# Patient Record
Sex: MALE | Race: BLACK OR AFRICAN AMERICAN | Employment: FULL TIME | URBAN - METROPOLITAN AREA
[De-identification: names, ages, dates, MRNs, and addresses within clinical notes are randomized per-mention and may not be internally consistent; named-entity substitution may affect disease eponyms.]

---

## 2019-03-22 ENCOUNTER — HOSPITAL ENCOUNTER (OUTPATIENT)
Dept: GENERAL RADIOLOGY | Age: 79
Discharge: HOME | End: 2019-03-22
Payer: COMMERCIAL

## 2019-03-22 ENCOUNTER — HOSPITAL ENCOUNTER (OUTPATIENT)
Dept: PREADMISSION TESTING | Age: 79
Discharge: HOME | End: 2019-03-22
Payer: COMMERCIAL

## 2019-03-22 VITALS
HEART RATE: 59 BPM | WEIGHT: 181 LBS | BODY MASS INDEX: 27.43 KG/M2 | TEMPERATURE: 97.7 F | SYSTOLIC BLOOD PRESSURE: 175 MMHG | HEIGHT: 68 IN | DIASTOLIC BLOOD PRESSURE: 72 MMHG

## 2019-03-22 DIAGNOSIS — Z01.818 PRE-OP TESTING: ICD-10-CM

## 2019-03-22 LAB
ABO + RH BLD: NORMAL
ALBUMIN SERPL-MCNC: 3.5 G/DL (ref 3.5–5)
ALBUMIN/GLOB SERPL: 0.9 {RATIO} (ref 1.1–2.2)
ALP SERPL-CCNC: 162 U/L (ref 45–117)
ALT SERPL-CCNC: 15 U/L (ref 12–78)
ANION GAP SERPL CALC-SCNC: 5 MMOL/L (ref 5–15)
APTT PPP: 29.9 SEC (ref 22.1–32)
AST SERPL-CCNC: 10 U/L (ref 15–37)
BASOPHILS # BLD: 0.1 K/UL (ref 0–0.1)
BASOPHILS NFR BLD: 1 % (ref 0–1)
BILIRUB SERPL-MCNC: 0.3 MG/DL (ref 0.2–1)
BLOOD GROUP ANTIBODIES SERPL: NORMAL
BUN SERPL-MCNC: 11 MG/DL (ref 6–20)
BUN/CREAT SERPL: 10 (ref 12–20)
CALCIUM SERPL-MCNC: 9.4 MG/DL (ref 8.5–10.1)
CHLORIDE SERPL-SCNC: 108 MMOL/L (ref 97–108)
CO2 SERPL-SCNC: 28 MMOL/L (ref 21–32)
CREAT SERPL-MCNC: 1.13 MG/DL (ref 0.7–1.3)
DIFFERENTIAL METHOD BLD: ABNORMAL
EOSINOPHIL # BLD: 0.3 K/UL (ref 0–0.4)
EOSINOPHIL NFR BLD: 5 % (ref 0–7)
ERYTHROCYTE [DISTWIDTH] IN BLOOD BY AUTOMATED COUNT: 14.7 % (ref 11.5–14.5)
GLOBULIN SER CALC-MCNC: 3.8 G/DL (ref 2–4)
GLUCOSE SERPL-MCNC: 84 MG/DL (ref 65–100)
HCT VFR BLD AUTO: 44.3 % (ref 36.6–50.3)
HGB BLD-MCNC: 14.6 G/DL (ref 12.1–17)
IMM GRANULOCYTES # BLD AUTO: 0 K/UL (ref 0–0.04)
IMM GRANULOCYTES NFR BLD AUTO: 0 % (ref 0–0.5)
INR PPP: 1 (ref 0.9–1.1)
LYMPHOCYTES # BLD: 1.7 K/UL (ref 0.8–3.5)
LYMPHOCYTES NFR BLD: 30 % (ref 12–49)
MCH RBC QN AUTO: 29.6 PG (ref 26–34)
MCHC RBC AUTO-ENTMCNC: 33 G/DL (ref 30–36.5)
MCV RBC AUTO: 89.9 FL (ref 80–99)
MONOCYTES # BLD: 0.5 K/UL (ref 0–1)
MONOCYTES NFR BLD: 10 % (ref 5–13)
NEUTS SEG # BLD: 3 K/UL (ref 1.8–8)
NEUTS SEG NFR BLD: 54 % (ref 32–75)
NRBC # BLD: 0 K/UL (ref 0–0.01)
NRBC BLD-RTO: 0 PER 100 WBC
PLATELET # BLD AUTO: 286 K/UL (ref 150–400)
PMV BLD AUTO: 9.7 FL (ref 8.9–12.9)
POTASSIUM SERPL-SCNC: 4.2 MMOL/L (ref 3.5–5.1)
PROT SERPL-MCNC: 7.3 G/DL (ref 6.4–8.2)
PROTHROMBIN TIME: 9.9 SEC (ref 9–11.1)
RBC # BLD AUTO: 4.93 M/UL (ref 4.1–5.7)
SODIUM SERPL-SCNC: 141 MMOL/L (ref 136–145)
SPECIMEN EXP DATE BLD: NORMAL
THERAPEUTIC RANGE,PTTT: NORMAL SECS (ref 58–77)
WBC # BLD AUTO: 5.5 K/UL (ref 4.1–11.1)

## 2019-03-22 PROCEDURE — 93005 ELECTROCARDIOGRAM TRACING: CPT

## 2019-03-22 PROCEDURE — 86900 BLOOD TYPING SEROLOGIC ABO: CPT

## 2019-03-22 PROCEDURE — 85610 PROTHROMBIN TIME: CPT

## 2019-03-22 PROCEDURE — 71046 X-RAY EXAM CHEST 2 VIEWS: CPT

## 2019-03-22 PROCEDURE — 85730 THROMBOPLASTIN TIME PARTIAL: CPT

## 2019-03-22 PROCEDURE — 36415 COLL VENOUS BLD VENIPUNCTURE: CPT

## 2019-03-22 PROCEDURE — 85025 COMPLETE CBC W/AUTO DIFF WBC: CPT

## 2019-03-22 PROCEDURE — 80053 COMPREHEN METABOLIC PANEL: CPT

## 2019-03-22 RX ORDER — CLOPIDOGREL BISULFATE 75 MG/1
75 TABLET ORAL
Status: ON HOLD | COMMUNITY
End: 2022-05-13

## 2019-03-22 RX ORDER — ASPIRIN 81 MG/1
81 TABLET ORAL DAILY
COMMUNITY

## 2019-03-22 RX ORDER — LOSARTAN POTASSIUM AND HYDROCHLOROTHIAZIDE 25; 100 MG/1; MG/1
1 TABLET ORAL DAILY
COMMUNITY

## 2019-03-22 RX ORDER — CILOSTAZOL 100 MG/1
TABLET ORAL
COMMUNITY

## 2019-03-22 RX ORDER — AMLODIPINE BESYLATE 10 MG/1
10 TABLET ORAL DAILY
COMMUNITY
End: 2019-12-11 | Stop reason: SDUPTHER

## 2019-03-22 NOTE — PERIOP NOTES
Patient verbalizes understanding of preoperative instructions:  Given skin prep chlorhexidine wipes-given written and verbal instructions on use. Pre-Operative Instructions    DO NOT EAT OR DRINK ANYTHING AFTER MIDNIGHT THE NIGHT BEFORE SURGERY. EKG OK PER ANESTHESIA DR. HILL.

## 2019-03-24 LAB
ATRIAL RATE: 55 BPM
CALCULATED P AXIS, ECG09: 52 DEGREES
CALCULATED R AXIS, ECG10: 40 DEGREES
CALCULATED T AXIS, ECG11: 110 DEGREES
DIAGNOSIS, 93000: NORMAL
P-R INTERVAL, ECG05: 152 MS
Q-T INTERVAL, ECG07: 396 MS
QRS DURATION, ECG06: 84 MS
QTC CALCULATION (BEZET), ECG08: 378 MS
VENTRICULAR RATE, ECG03: 55 BPM

## 2019-03-25 NOTE — ADVANCED PRACTICE NURSE
LVM on nurse line and forwarded PAT labs to PCP, Kylie Srivastava, NP, for continuity of care and f/u as needed re elevated alk phos. Patient poor historian, unknown if this is known/baseline.

## 2019-03-28 ENCOUNTER — APPOINTMENT (OUTPATIENT)
Dept: NON INVASIVE DIAGNOSTICS | Age: 79
DRG: 039 | End: 2019-03-28
Attending: NURSE PRACTITIONER
Payer: COMMERCIAL

## 2019-03-28 ENCOUNTER — ANESTHESIA EVENT (OUTPATIENT)
Dept: SURGERY | Age: 79
DRG: 039 | End: 2019-03-28
Payer: COMMERCIAL

## 2019-03-28 ENCOUNTER — ANESTHESIA (OUTPATIENT)
Dept: SURGERY | Age: 79
DRG: 039 | End: 2019-03-28
Payer: COMMERCIAL

## 2019-03-28 ENCOUNTER — HOSPITAL ENCOUNTER (INPATIENT)
Age: 79
LOS: 1 days | Discharge: HOME | DRG: 039 | End: 2019-03-29
Attending: SURGERY | Admitting: SURGERY
Payer: COMMERCIAL

## 2019-03-28 DIAGNOSIS — I65.22 LEFT CAROTID ARTERY STENOSIS: Primary | ICD-10-CM

## 2019-03-28 PROCEDURE — 77030002916 HC SUT ETHLN J&J -A: Performed by: SURGERY

## 2019-03-28 PROCEDURE — 74011250637 HC RX REV CODE- 250/637: Performed by: SURGERY

## 2019-03-28 PROCEDURE — 77030012406 HC DRN WND PENRS BARD -A: Performed by: SURGERY

## 2019-03-28 PROCEDURE — 51798 US URINE CAPACITY MEASURE: CPT

## 2019-03-28 PROCEDURE — 77030005402 HC CATH RAD ART LN KT TELE -B

## 2019-03-28 PROCEDURE — 74011250636 HC RX REV CODE- 250/636

## 2019-03-28 PROCEDURE — 74011250636 HC RX REV CODE- 250/636: Performed by: ANESTHESIOLOGY

## 2019-03-28 PROCEDURE — 77030002987 HC SUT PROL J&J -B: Performed by: SURGERY

## 2019-03-28 PROCEDURE — 65610000003 HC RM ICU SURGICAL

## 2019-03-28 PROCEDURE — 74011250636 HC RX REV CODE- 250/636: Performed by: SURGERY

## 2019-03-28 PROCEDURE — 76010000172 HC OR TIME 2.5 TO 3 HR INTENSV-TIER 1: Performed by: SURGERY

## 2019-03-28 PROCEDURE — 88304 TISSUE EXAM BY PATHOLOGIST: CPT

## 2019-03-28 PROCEDURE — 74011000250 HC RX REV CODE- 250

## 2019-03-28 PROCEDURE — 77030031139 HC SUT VCRL2 J&J -A: Performed by: SURGERY

## 2019-03-28 PROCEDURE — 76210000006 HC OR PH I REC 0.5 TO 1 HR: Performed by: SURGERY

## 2019-03-28 PROCEDURE — 74011000272 HC RX REV CODE- 272: Performed by: SURGERY

## 2019-03-28 PROCEDURE — 88311 DECALCIFY TISSUE: CPT

## 2019-03-28 PROCEDURE — 77030011640 HC PAD GRND REM COVD -A: Performed by: SURGERY

## 2019-03-28 PROCEDURE — 76060000036 HC ANESTHESIA 2.5 TO 3 HR: Performed by: SURGERY

## 2019-03-28 PROCEDURE — C1768 GRAFT, VASCULAR: HCPCS | Performed by: SURGERY

## 2019-03-28 PROCEDURE — 77030032490 HC SLV COMPR SCD KNE COVD -B: Performed by: SURGERY

## 2019-03-28 PROCEDURE — 93306 TTE W/DOPPLER COMPLETE: CPT

## 2019-03-28 PROCEDURE — 03UL0JZ SUPPLEMENT LEFT INTERNAL CAROTID ARTERY WITH SYNTHETIC SUBSTITUTE, OPEN APPROACH: ICD-10-PCS | Performed by: SURGERY

## 2019-03-28 PROCEDURE — 77030002986 HC SUT PROL J&J -A: Performed by: SURGERY

## 2019-03-28 PROCEDURE — 77030018836 HC SOL IRR NACL ICUM -A: Performed by: SURGERY

## 2019-03-28 PROCEDURE — 77030014008 HC SPNG HEMSTAT J&J -C: Performed by: SURGERY

## 2019-03-28 PROCEDURE — 74011000250 HC RX REV CODE- 250: Performed by: SURGERY

## 2019-03-28 PROCEDURE — 77030002933 HC SUT MCRYL J&J -A: Performed by: SURGERY

## 2019-03-28 PROCEDURE — 77030013079 HC BLNKT BAIR HGGR 3M -A: Performed by: NURSE ANESTHETIST, CERTIFIED REGISTERED

## 2019-03-28 PROCEDURE — 77030008684 HC TU ET CUF COVD -B: Performed by: NURSE ANESTHETIST, CERTIFIED REGISTERED

## 2019-03-28 PROCEDURE — 77030005401 HC CATH RAD ARRO -A

## 2019-03-28 PROCEDURE — 03HB33Z INSERTION OF INFUSION DEVICE INTO RIGHT RADIAL ARTERY, PERCUTANEOUS APPROACH: ICD-10-PCS | Performed by: SURGERY

## 2019-03-28 PROCEDURE — 36620 INSERTION CATHETER ARTERY: CPT

## 2019-03-28 PROCEDURE — 77030020782 HC GWN BAIR PAWS FLX 3M -B

## 2019-03-28 PROCEDURE — 77030018824 HC SHNT CAR ARGY COVD -B: Performed by: SURGERY

## 2019-03-28 PROCEDURE — 77030020256 HC SOL INJ NACL 0.9%  500ML: Performed by: SURGERY

## 2019-03-28 PROCEDURE — 77030013567 HC DRN WND RESERV BARD -A: Performed by: SURGERY

## 2019-03-28 PROCEDURE — 77030026438 HC STYL ET INTUB CARD -A: Performed by: NURSE ANESTHETIST, CERTIFIED REGISTERED

## 2019-03-28 PROCEDURE — 77030018846 HC SOL IRR STRL H20 ICUM -A: Performed by: SURGERY

## 2019-03-28 PROCEDURE — 03CL0ZZ EXTIRPATION OF MATTER FROM LEFT INTERNAL CAROTID ARTERY, OPEN APPROACH: ICD-10-PCS | Performed by: SURGERY

## 2019-03-28 DEVICE — HEMASHIELD PLATINUM FINESSE ULTRA-THIN KNITTED CARDIOVASCULAR PATCH
Type: IMPLANTABLE DEVICE | Site: NECK | Status: FUNCTIONAL
Brand: HEMASHIELD

## 2019-03-28 RX ORDER — FENTANYL CITRATE 50 UG/ML
50 INJECTION, SOLUTION INTRAMUSCULAR; INTRAVENOUS AS NEEDED
Status: DISCONTINUED | OUTPATIENT
Start: 2019-03-28 | End: 2019-03-28 | Stop reason: HOSPADM

## 2019-03-28 RX ORDER — KETAMINE HYDROCHLORIDE 10 MG/ML
INJECTION, SOLUTION INTRAMUSCULAR; INTRAVENOUS AS NEEDED
Status: DISCONTINUED | OUTPATIENT
Start: 2019-03-28 | End: 2019-03-28 | Stop reason: HOSPADM

## 2019-03-28 RX ORDER — SODIUM CHLORIDE 0.9 % (FLUSH) 0.9 %
5-40 SYRINGE (ML) INJECTION EVERY 8 HOURS
Status: DISCONTINUED | OUTPATIENT
Start: 2019-03-28 | End: 2019-03-28 | Stop reason: HOSPADM

## 2019-03-28 RX ORDER — AMLODIPINE BESYLATE 5 MG/1
10 TABLET ORAL DAILY
Status: DISCONTINUED | OUTPATIENT
Start: 2019-03-29 | End: 2019-03-28

## 2019-03-28 RX ORDER — HYDROMORPHONE HYDROCHLORIDE 1 MG/ML
0.2 INJECTION, SOLUTION INTRAMUSCULAR; INTRAVENOUS; SUBCUTANEOUS
Status: DISCONTINUED | OUTPATIENT
Start: 2019-03-28 | End: 2019-03-28 | Stop reason: HOSPADM

## 2019-03-28 RX ORDER — SODIUM CHLORIDE 0.9 % (FLUSH) 0.9 %
5-40 SYRINGE (ML) INJECTION AS NEEDED
Status: DISCONTINUED | OUTPATIENT
Start: 2019-03-28 | End: 2019-03-28 | Stop reason: HOSPADM

## 2019-03-28 RX ORDER — SODIUM CHLORIDE, SODIUM LACTATE, POTASSIUM CHLORIDE, CALCIUM CHLORIDE 600; 310; 30; 20 MG/100ML; MG/100ML; MG/100ML; MG/100ML
100 INJECTION, SOLUTION INTRAVENOUS CONTINUOUS
Status: DISCONTINUED | OUTPATIENT
Start: 2019-03-28 | End: 2019-03-28 | Stop reason: HOSPADM

## 2019-03-28 RX ORDER — OXYCODONE HYDROCHLORIDE 5 MG/1
5 TABLET ORAL AS NEEDED
Status: DISCONTINUED | OUTPATIENT
Start: 2019-03-28 | End: 2019-03-28 | Stop reason: HOSPADM

## 2019-03-28 RX ORDER — ONDANSETRON 2 MG/ML
INJECTION INTRAMUSCULAR; INTRAVENOUS AS NEEDED
Status: DISCONTINUED | OUTPATIENT
Start: 2019-03-28 | End: 2019-03-28 | Stop reason: HOSPADM

## 2019-03-28 RX ORDER — SODIUM CHLORIDE, SODIUM LACTATE, POTASSIUM CHLORIDE, CALCIUM CHLORIDE 600; 310; 30; 20 MG/100ML; MG/100ML; MG/100ML; MG/100ML
50 INJECTION, SOLUTION INTRAVENOUS CONTINUOUS
Status: DISCONTINUED | OUTPATIENT
Start: 2019-03-28 | End: 2019-03-29

## 2019-03-28 RX ORDER — FENTANYL CITRATE 50 UG/ML
25 INJECTION, SOLUTION INTRAMUSCULAR; INTRAVENOUS
Status: DISCONTINUED | OUTPATIENT
Start: 2019-03-28 | End: 2019-03-28 | Stop reason: HOSPADM

## 2019-03-28 RX ORDER — KETOROLAC TROMETHAMINE 30 MG/ML
INJECTION, SOLUTION INTRAMUSCULAR; INTRAVENOUS AS NEEDED
Status: DISCONTINUED | OUTPATIENT
Start: 2019-03-28 | End: 2019-03-28 | Stop reason: HOSPADM

## 2019-03-28 RX ORDER — ONDANSETRON 2 MG/ML
4 INJECTION INTRAMUSCULAR; INTRAVENOUS AS NEEDED
Status: DISCONTINUED | OUTPATIENT
Start: 2019-03-28 | End: 2019-03-28 | Stop reason: HOSPADM

## 2019-03-28 RX ORDER — MIDAZOLAM HYDROCHLORIDE 1 MG/ML
0.5 INJECTION, SOLUTION INTRAMUSCULAR; INTRAVENOUS
Status: DISCONTINUED | OUTPATIENT
Start: 2019-03-28 | End: 2019-03-28 | Stop reason: HOSPADM

## 2019-03-28 RX ORDER — ROPIVACAINE HYDROCHLORIDE 5 MG/ML
30 INJECTION, SOLUTION EPIDURAL; INFILTRATION; PERINEURAL AS NEEDED
Status: DISCONTINUED | OUTPATIENT
Start: 2019-03-28 | End: 2019-03-28 | Stop reason: HOSPADM

## 2019-03-28 RX ORDER — FENTANYL CITRATE 50 UG/ML
50 INJECTION, SOLUTION INTRAMUSCULAR; INTRAVENOUS
Status: DISCONTINUED | OUTPATIENT
Start: 2019-03-28 | End: 2019-03-29 | Stop reason: HOSPADM

## 2019-03-28 RX ORDER — PROPOFOL 10 MG/ML
INJECTION, EMULSION INTRAVENOUS AS NEEDED
Status: DISCONTINUED | OUTPATIENT
Start: 2019-03-28 | End: 2019-03-28 | Stop reason: HOSPADM

## 2019-03-28 RX ORDER — DEXAMETHASONE SODIUM PHOSPHATE 4 MG/ML
INJECTION, SOLUTION INTRA-ARTICULAR; INTRALESIONAL; INTRAMUSCULAR; INTRAVENOUS; SOFT TISSUE AS NEEDED
Status: DISCONTINUED | OUTPATIENT
Start: 2019-03-28 | End: 2019-03-28 | Stop reason: HOSPADM

## 2019-03-28 RX ORDER — DEXMEDETOMIDINE HYDROCHLORIDE 4 UG/ML
INJECTION, SOLUTION INTRAVENOUS AS NEEDED
Status: DISCONTINUED | OUTPATIENT
Start: 2019-03-28 | End: 2019-03-28 | Stop reason: HOSPADM

## 2019-03-28 RX ORDER — DIPHENHYDRAMINE HYDROCHLORIDE 50 MG/ML
12.5 INJECTION, SOLUTION INTRAMUSCULAR; INTRAVENOUS AS NEEDED
Status: DISCONTINUED | OUTPATIENT
Start: 2019-03-28 | End: 2019-03-28 | Stop reason: HOSPADM

## 2019-03-28 RX ORDER — SUCCINYLCHOLINE CHLORIDE 20 MG/ML
INJECTION INTRAMUSCULAR; INTRAVENOUS AS NEEDED
Status: DISCONTINUED | OUTPATIENT
Start: 2019-03-28 | End: 2019-03-28 | Stop reason: HOSPADM

## 2019-03-28 RX ORDER — MIDAZOLAM HYDROCHLORIDE 1 MG/ML
1 INJECTION, SOLUTION INTRAMUSCULAR; INTRAVENOUS AS NEEDED
Status: DISCONTINUED | OUTPATIENT
Start: 2019-03-28 | End: 2019-03-28 | Stop reason: HOSPADM

## 2019-03-28 RX ORDER — LIDOCAINE HYDROCHLORIDE 20 MG/ML
INJECTION, SOLUTION EPIDURAL; INFILTRATION; INTRACAUDAL; PERINEURAL AS NEEDED
Status: DISCONTINUED | OUTPATIENT
Start: 2019-03-28 | End: 2019-03-28 | Stop reason: HOSPADM

## 2019-03-28 RX ORDER — ACETAMINOPHEN 325 MG/1
650 TABLET ORAL ONCE
Status: DISCONTINUED | OUTPATIENT
Start: 2019-03-28 | End: 2019-03-28 | Stop reason: HOSPADM

## 2019-03-28 RX ORDER — GLYCOPYRROLATE 0.2 MG/ML
INJECTION INTRAMUSCULAR; INTRAVENOUS AS NEEDED
Status: DISCONTINUED | OUTPATIENT
Start: 2019-03-28 | End: 2019-03-28 | Stop reason: HOSPADM

## 2019-03-28 RX ORDER — LABETALOL HYDROCHLORIDE 5 MG/ML
INJECTION, SOLUTION INTRAVENOUS AS NEEDED
Status: DISCONTINUED | OUTPATIENT
Start: 2019-03-28 | End: 2019-03-28 | Stop reason: HOSPADM

## 2019-03-28 RX ORDER — HEPARIN SODIUM 1000 [USP'U]/ML
INJECTION, SOLUTION INTRAVENOUS; SUBCUTANEOUS AS NEEDED
Status: DISCONTINUED | OUTPATIENT
Start: 2019-03-28 | End: 2019-03-28 | Stop reason: HOSPADM

## 2019-03-28 RX ORDER — MIDAZOLAM HYDROCHLORIDE 1 MG/ML
INJECTION, SOLUTION INTRAMUSCULAR; INTRAVENOUS AS NEEDED
Status: DISCONTINUED | OUTPATIENT
Start: 2019-03-28 | End: 2019-03-28 | Stop reason: HOSPADM

## 2019-03-28 RX ORDER — FENTANYL CITRATE 50 UG/ML
INJECTION, SOLUTION INTRAMUSCULAR; INTRAVENOUS AS NEEDED
Status: DISCONTINUED | OUTPATIENT
Start: 2019-03-28 | End: 2019-03-28 | Stop reason: HOSPADM

## 2019-03-28 RX ORDER — SODIUM CHLORIDE, SODIUM LACTATE, POTASSIUM CHLORIDE, CALCIUM CHLORIDE 600; 310; 30; 20 MG/100ML; MG/100ML; MG/100ML; MG/100ML
25 INJECTION, SOLUTION INTRAVENOUS CONTINUOUS
Status: DISCONTINUED | OUTPATIENT
Start: 2019-03-28 | End: 2019-03-28 | Stop reason: HOSPADM

## 2019-03-28 RX ORDER — ROCURONIUM BROMIDE 10 MG/ML
INJECTION, SOLUTION INTRAVENOUS AS NEEDED
Status: DISCONTINUED | OUTPATIENT
Start: 2019-03-28 | End: 2019-03-28 | Stop reason: HOSPADM

## 2019-03-28 RX ORDER — MORPHINE SULFATE 10 MG/ML
2 INJECTION, SOLUTION INTRAMUSCULAR; INTRAVENOUS
Status: DISCONTINUED | OUTPATIENT
Start: 2019-03-28 | End: 2019-03-28 | Stop reason: HOSPADM

## 2019-03-28 RX ORDER — ASPIRIN 81 MG/1
81 TABLET ORAL DAILY
Status: DISCONTINUED | OUTPATIENT
Start: 2019-03-29 | End: 2019-03-29 | Stop reason: HOSPADM

## 2019-03-28 RX ORDER — LIDOCAINE HYDROCHLORIDE 10 MG/ML
0.1 INJECTION, SOLUTION EPIDURAL; INFILTRATION; INTRACAUDAL; PERINEURAL AS NEEDED
Status: DISCONTINUED | OUTPATIENT
Start: 2019-03-28 | End: 2019-03-28 | Stop reason: HOSPADM

## 2019-03-28 RX ORDER — NEOSTIGMINE METHYLSULFATE 1 MG/ML
INJECTION INTRAVENOUS AS NEEDED
Status: DISCONTINUED | OUTPATIENT
Start: 2019-03-28 | End: 2019-03-28 | Stop reason: HOSPADM

## 2019-03-28 RX ORDER — CEFAZOLIN SODIUM/WATER 2 G/20 ML
2 SYRINGE (ML) INTRAVENOUS
Status: COMPLETED | OUTPATIENT
Start: 2019-03-28 | End: 2019-03-28

## 2019-03-28 RX ORDER — SODIUM CHLORIDE 9 MG/ML
25 INJECTION, SOLUTION INTRAVENOUS CONTINUOUS
Status: DISCONTINUED | OUTPATIENT
Start: 2019-03-28 | End: 2019-03-28 | Stop reason: HOSPADM

## 2019-03-28 RX ORDER — CLOPIDOGREL BISULFATE 75 MG/1
75 TABLET ORAL DAILY
Status: DISCONTINUED | OUTPATIENT
Start: 2019-03-29 | End: 2019-03-29 | Stop reason: HOSPADM

## 2019-03-28 RX ADMIN — FENTANYL CITRATE 50 MCG: 50 INJECTION, SOLUTION INTRAMUSCULAR; INTRAVENOUS at 15:16

## 2019-03-28 RX ADMIN — MIDAZOLAM HYDROCHLORIDE 3 MG: 1 INJECTION, SOLUTION INTRAMUSCULAR; INTRAVENOUS at 08:43

## 2019-03-28 RX ADMIN — KETOROLAC TROMETHAMINE 30 MG: 30 INJECTION, SOLUTION INTRAMUSCULAR; INTRAVENOUS at 11:12

## 2019-03-28 RX ADMIN — GLYCOPYRROLATE 0.2 MG: 0.2 INJECTION INTRAMUSCULAR; INTRAVENOUS at 09:43

## 2019-03-28 RX ADMIN — FENTANYL CITRATE 50 MCG: 50 INJECTION, SOLUTION INTRAMUSCULAR; INTRAVENOUS at 11:14

## 2019-03-28 RX ADMIN — FENTANYL CITRATE 50 MCG: 50 INJECTION, SOLUTION INTRAMUSCULAR; INTRAVENOUS at 08:43

## 2019-03-28 RX ADMIN — ROCURONIUM BROMIDE 30 MG: 10 INJECTION, SOLUTION INTRAVENOUS at 09:27

## 2019-03-28 RX ADMIN — FENTANYL CITRATE 50 MCG: 50 INJECTION, SOLUTION INTRAMUSCULAR; INTRAVENOUS at 09:10

## 2019-03-28 RX ADMIN — SODIUM CHLORIDE, SODIUM LACTATE, POTASSIUM CHLORIDE, AND CALCIUM CHLORIDE 25 ML/HR: 600; 310; 30; 20 INJECTION, SOLUTION INTRAVENOUS at 08:52

## 2019-03-28 RX ADMIN — LIDOCAINE HYDROCHLORIDE 80 MG: 20 INJECTION, SOLUTION EPIDURAL; INFILTRATION; INTRACAUDAL; PERINEURAL at 09:10

## 2019-03-28 RX ADMIN — DEXAMETHASONE SODIUM PHOSPHATE 8 MG: 4 INJECTION, SOLUTION INTRA-ARTICULAR; INTRALESIONAL; INTRAMUSCULAR; INTRAVENOUS; SOFT TISSUE at 09:34

## 2019-03-28 RX ADMIN — Medication 2 G: at 09:27

## 2019-03-28 RX ADMIN — SODIUM CHLORIDE: 900 INJECTION, SOLUTION INTRAVENOUS at 09:20

## 2019-03-28 RX ADMIN — ROCURONIUM BROMIDE 10 MG: 10 INJECTION, SOLUTION INTRAVENOUS at 09:10

## 2019-03-28 RX ADMIN — ONDANSETRON 4 MG: 2 INJECTION INTRAMUSCULAR; INTRAVENOUS at 11:12

## 2019-03-28 RX ADMIN — KETAMINE HYDROCHLORIDE 10 MG: 10 INJECTION, SOLUTION INTRAMUSCULAR; INTRAVENOUS at 10:11

## 2019-03-28 RX ADMIN — HEPARIN SODIUM 5000 UNITS: 1000 INJECTION, SOLUTION INTRAVENOUS; SUBCUTANEOUS at 10:03

## 2019-03-28 RX ADMIN — FENTANYL CITRATE 50 MCG: 50 INJECTION, SOLUTION INTRAMUSCULAR; INTRAVENOUS at 20:17

## 2019-03-28 RX ADMIN — KETAMINE HYDROCHLORIDE 20 MG: 10 INJECTION, SOLUTION INTRAMUSCULAR; INTRAVENOUS at 09:32

## 2019-03-28 RX ADMIN — FENTANYL CITRATE 50 MCG: 50 INJECTION, SOLUTION INTRAMUSCULAR; INTRAVENOUS at 09:37

## 2019-03-28 RX ADMIN — DEXMEDETOMIDINE HYDROCHLORIDE 8 MCG: 4 INJECTION, SOLUTION INTRAVENOUS at 10:02

## 2019-03-28 RX ADMIN — GLYCOPYRROLATE 0.2 MG: 0.2 INJECTION INTRAMUSCULAR; INTRAVENOUS at 11:50

## 2019-03-28 RX ADMIN — HYDROCHLOROTHIAZIDE: 25 TABLET ORAL at 15:14

## 2019-03-28 RX ADMIN — PROPOFOL 120 MG: 10 INJECTION, EMULSION INTRAVENOUS at 09:10

## 2019-03-28 RX ADMIN — DEXMEDETOMIDINE HYDROCHLORIDE 12 MCG: 4 INJECTION, SOLUTION INTRAVENOUS at 09:37

## 2019-03-28 RX ADMIN — LABETALOL HYDROCHLORIDE 5 MG: 5 INJECTION, SOLUTION INTRAVENOUS at 11:09

## 2019-03-28 RX ADMIN — GLYCOPYRROLATE 0.6 MG: 0.2 INJECTION INTRAMUSCULAR; INTRAVENOUS at 11:12

## 2019-03-28 RX ADMIN — SODIUM CHLORIDE, POTASSIUM CHLORIDE, SODIUM LACTATE AND CALCIUM CHLORIDE 50 ML/HR: 600; 310; 30; 20 INJECTION, SOLUTION INTRAVENOUS at 13:00

## 2019-03-28 RX ADMIN — NEOSTIGMINE METHYLSULFATE 3 MG: 1 INJECTION INTRAVENOUS at 11:12

## 2019-03-28 RX ADMIN — SUCCINYLCHOLINE CHLORIDE 160 MG: 20 INJECTION INTRAMUSCULAR; INTRAVENOUS at 09:10

## 2019-03-28 NOTE — OP NOTES
[address]   OPERATIVE REPORT    Name:  Yuliana Lu  MR#:  [id number]  :  1940  ACCOUNT #:  [de-identified]  DATE OF SERVICE:  2019    PREOPERATIVE DIAGNOSIS:  Left carotid artery stenosis. POSTOPERATIVE DIAGNOSIS:  Left carotid artery stenosis. PROCEDURE PERFORMED:  Left carotid endarterectomy. SURGEON:  MD Sandy Wang. ANESTHESIA:  General.    COMPLICATIONS:  None. SPECIMENS REMOVED:  Left carotid plaque. IMPLANTS:  Dacron patch. ESTIMATED BLOOD LOSS:  25 mL. INDICATIONS FOR PROCEDURE:  The patient is a 61-year-old gentleman who had incidentally found high-grade left carotid stenosis on ultrasound. This was performed due to a bruit. This revealed 80% greater stenosis to the left carotid artery. He presents for carotid endarterectomy for stroke prevention. PROCEDURE:  The patient was placed supine on the operating table and general anesthesia was established. The left neck was prepped and draped in standard surgical fashion. An oblique incision was made taking down to the skin, subcutaneous tissue, along the media to the sternocleidomastoid muscle, was taking down to the platysma muscle. The sternocleidomastoid and internal jugular vein were retracted from the patient left laterally. The facial vein was identified, divided and ligated with silk ties. Hypoglossal nerve and ansa cervicalis nerves were identified and kept out of harm's way. The common carotid, external carotid branches, and internal carotid artery were all dissected out. Vessel loops were placed around these for control. The patient was infused with 5000 units of heparin and after waiting 3 minutes, the loops were pulled out to occlude the arteries. An arteriotomy was made from the common carotid on to the internal carotid artery. A focal high-grade stenosis was seen. An 8 [place] shunt was placed.   An endarterectomy was then performed with a Lillian Cuellar device. Two 7-0 sutures were placed to prevent an intimal separation. After this, an 8-mm patch was then sewn in using running 5-0 Prolene in running fashion. Prior to completing the patch and anastomosis, the shunt was removed and the internal carotid artery was back bled and flushed. After completion of the anastomosis, four perfusion was allowed to be flushed with external carotid artery prior to opening up the internal carotid artery. Doppler signal was checked and found normal.  After this, the wound was irrigated, cleaned, and dried, closed in 2  layers of 3-0 Vicryl. A 7 LIAM drain was placed via a separate stab incision in the neck inferior to the original incision. This was sewn with 3-0 nylon. The skin was closed with 4-0 Monocryl suture followed by Mastisol, Steri-Strip, and dry sterile dressing. The patient tolerated the procedure well, was in the process of waking up.         Jessica Garcia MD AM/[id number]_I/  D:  03/28/2019 11:27  T:  03/28/2019 14:54  JOB #:  [id number]  CC:  Melinda Torres NP

## 2019-03-28 NOTE — BRIEF OP NOTE
BRIEF OPERATIVE NOTE    Date of Procedure: 3/28/2019   Preoperative Diagnosis: CAROTID STENOSIS, LEFT  Postoperative Diagnosis: CAROTID STENOSIS, LEFT    Procedure(s):  LEFT CAROTID ENDARTERECTOMY  Surgeon(s) and Role:     Kareem Rincon MD - Primary         Surgical Assistant: Torie Mcneal    Surgical Staff:  Circ-1: Vicenta Tripathi RN  Scrub Tech-1: Tio Prabhakar  Surg Asst-1: Ute Cooler  Surg Asst-Relief: Dee Holly  Event Time In Time Out   Incision Start 0935    Incision Close 1113      Anesthesia: General   Estimated Blood Loss: 25cc  Specimens:   ID Type Source Tests Collected by Time Destination   1 : left cartoid artery plaque Fresh Artery  Queenie Reed MD 3/28/2019 8548 Pathology      Findings: focal high grade stenosis  Complications: none  Implants:   Implant Name Type Inv.  Item Serial No.  Lot No. LRB No. Used Action   PATCH VASC PLAT FINESSE [id number] --  - [id number]  PATCH VASC PLAT FINESSE 8X76MM --  [id number] [employer] CARDIOVASCLR [id number] Left 1 Implanted

## 2019-03-28 NOTE — PERIOP NOTES
Apprised Dr. Greg Posadas, anesthesia, of heart rate mid forties  No orders received, okay to d/c to [facility].

## 2019-03-28 NOTE — PROGRESS NOTES
1339:  Patient arrived from PACU on monitor and 3L NC. Patient is bradycardic with stable BP. NIH screen performed, no problems. 1420:  Spoke with Dr. Drew Solitario regarding pain medication and bradycardia. Orders will be entered for medication and a consult to cardiology. 1520:  Bladder scan >200mL. Patient attempting to use urinal.  50mg fentanyl given for L neck post procedure pain. 2000:  Bedside and Verbal shift change report given to ALEX MARCELO (oncoming nurse) by Abelino Enrique RN (offgoing nurse). Report included the following information SBAR, Kardex, Intake/Output, MAR, Recent Results and Cardiac Rhythm sinus dick.

## 2019-03-28 NOTE — CONSULTS
Cardiology Consult Note      Patient Name: Toby Francois  : 1940 MRN: [id number]  Date: 3/28/2019  Time: 3:10 PM    Admit Diagnosis: Left carotid artery stenosis [I65.22]    Primary Cardiologist: none Consulting Cardiologist: Kalyani Gonzalez M.D.    Reason for Consult: bradycardia    Requesting MD: Dr. Marry Arroyo MD    HPI:  Toby Francois is a 70 y.o. male admitted on 3/28/2019  for Left carotid artery stenosis [I65.22]. Has PMH of HTN, PVD (reports he has had stent placed to left leg), carotid disease, tobacco abuse. Pt denies hx of CAD. Has never seen a cardiologist, no cardiac testing hx. Underwent Left carotid endarterectomy today. Pt has noted to have sinus bradycardia with HR in 40's and cardiology consulted. Pt denies any current or history of chest pain, palpitations, dizziness, SOB. Denies any hx of QUIJANO. Does report not able to walk longer distances due to leg pain with walking longer distances. Denies any hx of syncope or knowledge of low HR at baseline. No preoperative vitals found in chart. SH: current smoker 1.5 ppd. No ETOH use      Assessment and Plan     1. Sinus bradycardia: HR lows to 43  -12 lead EKG: NSR, nonspecific t wave abnormality lateral leads  -Uncertain baseline HR prior to surgery  -Pt is asymptomatic and HR increases to upper 60's with movement of arms /legs.  -Would stop amlodipine for now and see if HR increased. -Will check Echo  -no indication for PPM currently    2. Hx of HTN:  BP currently controlled  -Stop norvasc for now   -Continue Losartan-HCTZ    3. Carotid artery disease: s/p Left carotid endarterectomy  -on ASA and plavix    Pt seen by Dr. Lalo Javier. Asymptomatic sinus bradycardia. Stop amlodipine for now. Will check echo. Cardiology Attending:Patient seen and examined. I agree with NP assessment and plans.   Suspect sinus dick may be due to procedure, HR rapidly increases with minimal exertion. Joaquin Jarquin MD 3/28/2019 4:10 PM         Review of Symptoms:  Constitutional: Negative for fever, chills,   HEENT: Negative for dysphagia  Respiratory: Negative for SOB  Cardiovascular: Negative for chest pain, palpitations, orthopnea, leg swelling, syncope, and PND. Negative for dizziness. Gastrointestinal: Negative for , blood in stool and melena,  Genitourinary: Negative for hematuria. Musculoskeletal: Negative for myalgias  Skin: Negative for rash. Heme: Does not bleed or bruise easily. Neurological: Negative for speech changes and focal weakness      Previous treatment/evaluation includes  . Cardiac risk factors: smoking/ tobacco exposure, obesity, sedentary life style, male gender, hypertension.     Past Medical History:   Diagnosis Date    CAD (coronary artery disease)     Hypertension      Past Surgical History:   Procedure Laterality Date    VASCULAR SURGERY PROCEDURE UNLIST  2017    STENT IN LEFT LEG     Current Facility-Administered Medications   Medication Dose Route Frequency    [START ON 3/29/2019] amLODIPine (NORVASC) tablet 10 mg  10 mg Oral DAILY    [START ON 3/29/2019] aspirin delayed-release tablet 81 mg  81 mg Oral DAILY    [START ON 3/29/2019] clopidogrel (PLAVIX) tablet 75 mg  75 mg Oral DAILY    PHENYLephrine (SARAH-SYNEPHRINE) 30 mg in 0.9% sodium chloride 250 mL infusion   mcg/min IntraVENous TITRATE    niCARdipine (CARDENE) 25 mg in 0.9% sodium chloride 250 mL infusion  0-15 mg/hr IntraVENous TITRATE    lactated Ringers infusion  50 mL/hr IntraVENous CONTINUOUS    losartan/hydroCHLOROthiazide (HYZAAR) 100/25 mg   Oral DAILY    fentaNYL citrate (PF) injection 50 mcg  50 mcg IntraVENous Q3H PRN       No Known Allergies   Family History   Problem Relation Age of Onset    Arthritis-osteo Mother     Heart Disease Mother     Heart Disease Father     No Known Problems Sister     No Known Problems Brother     No Known Problems Sister    Verdis Shames Problems Neg Hx       Social History     Socioeconomic History    Marital status:      Spouse name: Not on file    Number of children: Not on file    Years of education: Not on file    Highest education level: Not on file   Tobacco Use    Smoking status: Current Every Day Smoker     Packs/day: 1.00     Years: 61.00     Pack years: 61.00    Smokeless tobacco: Never Used   Substance and Sexual Activity    Alcohol use: Not Currently    Drug use: Never       Objective:    Physical Exam    Vitals:   Vitals:    03/28/19 1345 03/28/19 1400 03/28/19 1415 03/28/19 1430   BP:  120/55     Pulse: (!) 43 (!) 41 (!) 45 (!) 50   Resp: 14 15 14 16   Temp:  97.8 °F (36.6 °C) 97.7 °F (36.5 °C) 97.8 °F (36.6 °C)   SpO2: 97% 98% 97% 97%   Weight:       Height:           General:    Alert, cooperative, no distress, appears stated age. Neck:   Supple, left neck dressing and LIAM drain in place. Back:     Not examined   Lungs:     clear to auscultation bilaterally. Heart[de-identified]    Regular rate and rhythm, S1, S2 normal, no murmur, click, rub or gallop. Abdomen:     Soft, non-tender. Bowel sounds normal. No masses,  No      organomegaly. Extremities:   Extremities normal, atraumatic, no cyanosis or edema. Vascular:   Pulses - 2+ radial   Skin:   Skin color normal. No rashes or lesions   Neurologic:   Alert, [place]. Telemetry: normal sinus rhythm- sinus bradycardia    ECG: NSR with nonspecific t wave abnormality lateral leads. Data Review:     Radiology:     No results for input(s): CPK, TROIQ in the last 72 hours. No lab exists for component: CKQMB, CPKMB, BMPP  No results for input(s): NA, K, CL, CO2, BUN, CREA, GLU, PHOS, CA in the last 72 hours. No results for input(s): WBC, HGB, HCT, PLT, HGBEXT, HCTEXT, PLTEXT in the last 72 hours. No results for input(s): PTP, INR, GPT, SGOT, AP in the last 72 hours. No lab exists for component: PTTP, INREXT  No results for input(s): CHOL, LDLC in the last 72 hours.     No lab exists for component: TGL, HDLC,  HBA1C  No results for input(s): CRP, TSH, TSHEXT in the last 72 hours. No lab exists for component: ESR    Thank you very much for this referral. I appreciate the opportunity to participate in this patient's care. I will follow along with above stated plan. Bladimir Colunga.  DEION Ramirez         [facility] [address] [place] [address],[address]     Siddhartha Palma     [phone]    Catarino Murguia NP

## 2019-03-28 NOTE — PERIOP NOTES
TRANSFER - OUT REPORT:    [facility] on Flores Bran  being transferred to [facility](unit) for routine post - op       Report consisted of patients Situation, Background, Assessment and   Recommendations(SBAR). Time Pre op antibiotic given:0927  Anesthesia Stop time: 2154  Sheriff Present on Transfer to floor:n  Order for Sheriff on Chart:n  Discharge Prescriptions with Chart:n    Information from the following report(s) SBAR, OR Summary, Intake/Output, MAR and Cardiac Rhythm sinus dick was reviewed with the receiving nurse. Opportunity for questions and clarification was provided. Is the patient on 02? YES       L/Min 2       Other     Is the patient on a monitor? YES    Is the nurse transporting with the patient? YES    Surgical Waiting Area notified of patient's transfer from PACU? YES, via volunteer Jose      The following personal items collected during your admission accompanied patient upon transfer:   Dental Appliance: Dental Appliances: None  Vision: Visual Aid: Glasses(sent to [facility])  Hearing Aid: Hearing Aid: Bilateral  Jewelry: Jewelry: Watch(watch in pt back pocekt of pant in belongings bag)  Clothing: Clothing: (2 clothing bags returned to pt in pacu)  Other Valuables:  Other Valuables: (hrg aids placed in lizet ears in pacu; specs in clothing bag)  Valuables sent to safe:

## 2019-03-28 NOTE — PERIOP NOTES
Dr. Yony Echavarria at bedside upon pacu admit. Patient still somnolent, not arousable. Robinul given IV by crna for bradycardia. Oral suctions provided and oral airway remains intact.

## 2019-03-28 NOTE — ROUTINE PROCESS
Patient: Rakan Cervantes MRN: [id number]  SSN: [id number]   YOB: 1940  Age: 70 y.o. Sex: male     Patient is status post Procedure(s):  LEFT CAROTID ENDARTERECTOMY. Surgeon(s) and Role:     Jesica Diaz MD - Primary    Local/Dose/Irrigation:  See [facility]                  Peripheral IV 03/28/19 Left Hand (Active)   Site Assessment Clean, dry, & intact 3/28/2019  8:41 AM   Phlebitis Assessment 0 3/28/2019  8:41 AM   Infiltration Assessment 0 3/28/2019  8:41 AM   Dressing Status Clean, dry, & intact 3/28/2019  8:41 AM   Dressing Type Transparent;Tape 3/28/2019  8:41 AM   Hub Color/Line Status Pink; Infusing 3/28/2019  8:41 AM       Peripheral IV 03/28/19 Right Forearm (Active)      Arterial Line 03/28/19 Right Radial artery (Active)        Mac-Kendall Drain 03/28/19 Left Neck (Active)   Site Assessment Clean, dry, & intact 3/28/2019 11:04 AM   Dressing Status Clean, dry, & intact 3/28/2019 11:04 AM   Status Charged 3/28/2019 11:04 AM      Airway - Endotracheal Tube 03/28/19 Oral (Active)                   Dressing/Packing:     Splint/Cast:  ]

## 2019-03-28 NOTE — ANESTHESIA POSTPROCEDURE EVALUATION
Post-Anesthesia Evaluation and Assessment Patient: Dain Baker MRN: [id number]  SSN: [id number] YOB: 1940  Age: 70 y.o. Sex: male I have evaluated the patient and they are stable and ready for discharge from the PACU. Cardiovascular Function/Vital Signs Visit Vitals /58 (BP 1 Location: Left arm, BP Patient Position: At rest) Pulse (!) 49 Temp 36.5 °C (97.7 °F) Resp 15 Ht 5' 8\" (1.727 m) Wt 82.1 kg (181 lb) SpO2 97% BMI 27.52 kg/m² Patient is status post General anesthesia for Procedure(s): LEFT CAROTID ENDARTERECTOMY. Nausea/Vomiting: None Postoperative hydration reviewed and adequate. Pain: 
Pain Scale 1: Numeric (0 - 10) (03/28/19 1339) Pain Intensity 1: 3 (03/28/19 1339) Managed Neurological Status:  
Neuro FF [facility]): (slight droop R mouth as noted also preoperatively by crna) (03/28/19 1215) Neuro LUE Motor Response: Purposeful (03/28/19 1215) LLE Motor Response: Purposeful (03/28/19 1215) RUE Motor Response: Purposeful (03/28/19 1215) RLE Motor Response: Purposeful (03/28/19 1215) At baseline Mental Status, Level of Consciousness: Alert and  oriented to person, place, and time Pulmonary Status:  
O2 Device: Nasal cannula (03/28/19 1339) Adequate oxygenation and airway patent Complications related to anesthesia: None Post-anesthesia assessment completed. No concerns Signed By: Reinaldo Acosta MD   
 March 28, 2019 Procedure(s): LEFT CAROTID ENDARTERECTOMY. general 
 
<BSHSIANPOST> Vitals Value Taken Time /49 3/28/2019  1:00 PM  
Temp 36.1 °C (97 °F) 3/28/2019 12:46 PM  
Pulse 44 3/28/2019  1:15 PM  
Resp 13 3/28/2019  1:15 PM  
SpO2 96 % 3/28/2019  1:15 PM  
Vitals shown include unvalidated device data.

## 2019-03-28 NOTE — ANESTHESIA PREPROCEDURE EVALUATION
Relevant Problems No relevant active problems Anesthetic History No history of anesthetic complications Review of Systems / Medical History Patient summary reviewed, nursing notes reviewed and pertinent labs reviewed Pulmonary Smoker Neuro/Psych Within defined limits Cardiovascular Hypertension CAD and PAD Exercise tolerance: >4 METS 
  
GI/Hepatic/Renal 
Within defined limits Endo/Other Within defined limits Other Findings Physical Exam 
 
Airway Mallampati: II 
TM Distance: 4 - 6 cm Neck ROM: normal range of motion Mouth opening: Normal 
 
 Cardiovascular Regular rate and rhythm,  S1 and S2 normal,  no murmur, click, rub, or gallop Dental 
 
Dentition: Full upper dentures and Poor dentition Pulmonary Breath sounds clear to auscultation Abdominal 
GI exam deferred Other Findings Anesthetic Plan ASA: 3 Anesthesia type: general 
 
Monitoring Plan: Arterial line Induction: Intravenous Anesthetic plan and risks discussed with: Patient

## 2019-03-28 NOTE — ANESTHESIA PROCEDURE NOTES
Arterial Line Placement Start time: 3/28/2019 8:55 AM 
End time: 3/28/2019 9:00 AM 
Performed by: Wendy Dangelo MD 
Authorized by: Wendy Dangelo MD  
 
Pre-Procedure Indications:  Arterial pressure monitoring Preanesthetic Checklist: patient identified, risks and benefits discussed, anesthesia consent, site marked, patient being monitored, timeout performed and patient being monitored Procedure:  
Prep:  Chlorhexidine Seldinger Technique?: Yes Orientation:  Right Location:  Radial artery Catheter size:  20 G Number of attempts:  3 Cont Cardiac Output Sensor: No   
 
Assessment:  
Post-procedure:  Line secured and sterile dressing applied Patient Tolerance:  Patient tolerated the procedure well with no immediate complications

## 2019-03-29 ENCOUNTER — TELEPHONE (OUTPATIENT)
Dept: CARDIOLOGY CLINIC | Age: 79
End: 2019-03-29

## 2019-03-29 VITALS
OXYGEN SATURATION: 95 % | HEART RATE: 60 BPM | BODY MASS INDEX: 27.17 KG/M2 | RESPIRATION RATE: 20 BRPM | SYSTOLIC BLOOD PRESSURE: 123 MMHG | HEIGHT: 68 IN | WEIGHT: 179.3 LBS | DIASTOLIC BLOOD PRESSURE: 98 MMHG | TEMPERATURE: 97.6 F

## 2019-03-29 PROCEDURE — 74011250637 HC RX REV CODE- 250/637: Performed by: SURGERY

## 2019-03-29 PROCEDURE — 74011250636 HC RX REV CODE- 250/636: Performed by: SURGERY

## 2019-03-29 RX ORDER — OXYCODONE HYDROCHLORIDE 5 MG/1
5 TABLET ORAL
Qty: 30 TAB | Refills: 0 | Status: SHIPPED | OUTPATIENT
Start: 2019-03-29 | End: 2019-04-01

## 2019-03-29 RX ORDER — AMLODIPINE BESYLATE 5 MG/1
10 TABLET ORAL DAILY
Status: DISCONTINUED | OUTPATIENT
Start: 2019-03-30 | End: 2019-03-29 | Stop reason: HOSPADM

## 2019-03-29 RX ORDER — OXYCODONE HYDROCHLORIDE 5 MG/1
5 TABLET ORAL
Status: DISCONTINUED | OUTPATIENT
Start: 2019-03-29 | End: 2019-03-29 | Stop reason: HOSPADM

## 2019-03-29 RX ADMIN — CLOPIDOGREL BISULFATE 75 MG: 75 TABLET ORAL at 08:56

## 2019-03-29 RX ADMIN — FENTANYL CITRATE 50 MCG: 50 INJECTION, SOLUTION INTRAMUSCULAR; INTRAVENOUS at 04:24

## 2019-03-29 RX ADMIN — HYDROCHLOROTHIAZIDE: 25 TABLET ORAL at 08:56

## 2019-03-29 RX ADMIN — OXYCODONE HYDROCHLORIDE 5 MG: 5 TABLET ORAL at 11:00

## 2019-03-29 RX ADMIN — ASPIRIN 81 MG: 81 TABLET ORAL at 08:56

## 2019-03-29 NOTE — PROGRESS NOTES
Vascular Surgery  --no complaints other than soreness  --appreciate Dr. Opal Segal assistance re: [email or web address]  Patient Vitals for the past 12 hrs:   Temp Pulse Resp BP SpO2   03/29/19 0900 -- (!) 47 18 158/63 95 %   03/29/19 0800 98.3 °F (36.8 °C) (!) 52 16 152/55 93 %   03/29/19 0700 -- (!) 43 15 -- 93 %   03/29/19 0600 -- (!) 38 16 -- 94 %   03/29/19 0500 -- (!) 41 19 -- 94 %   03/29/19 0400 98.6 °F (37 °C) (!) 55 18 -- 95 %   03/29/19 0300 -- (!) 41 22 -- 95 %   03/29/19 0200 -- (!) 44 17 -- 96 %   03/29/19 0100 -- (!) 42 11 -- 96 %   03/29/19 0000 98.3 °F (36.8 °C) (!) 43 21 -- 95 %   03/28/19 2300 -- (!) 43 11 -- 94 %   03/28/19 2200 -- (!) 54 19 -- 92 %     Neck flat; no neuro deficits; tongue midline    Plan:  --OOB  --drain removed  --home today if ok with Cardiology; holding Norvasc for a few more days?

## 2019-03-29 NOTE — PROGRESS NOTES
Reason for Admission:   Lt carotid artery stenosis, s/p Lt carotid endarterectomy. RRAT Score:   3 / low                  Plan for utilizing home health:   none                       Current Advanced Directive/Advance Care Plan Does not have. Pt's daughter, Sherri Parker [phone],[id number] make medical decisions if pt were not able to do so. Likelihood of Readmission:  low                         Transition of Care Plan: Will return home and f/u with Dr Tammy Rachel. Pt's nurse to make f/u appt.      Care Management Interventions  PCP Verified by CM: Makenzie Egan NP, last office visit was 3/19/19)  Palliative Care Criteria Met (RRAT>21 & CHF Dx)?: No  Mode of Transport at Discharge: (daughter will )  Current Support Network: Lives Alone  Confirm Follow Up Transport: Self  Plan discussed with Pt/Family/Caregiver: Yes  Discharge Location  Discharge Placement: Taryn Ellison RN ACM CRM

## 2019-03-29 NOTE — PROGRESS NOTES
1945: Bedside shift change report given to Chey Liao RN (oncoming nurse) by Mary Husain RN (offgoing nurse). Report included the following information SBAR, Kardex, Procedure Summary, Intake/Output, MAR, Accordion, Recent Results, Med Rec Status and Cardiac Rhythm SB w/ 1st degree AVB . 2000: Assumed care of patient resting quietly in bed. Have some moderate pain and requesting pain medication. Pt is AO x 4; HR low 50s.   0700: Uneventful shift. Pt did have HR as low as 38, but only for a few minutes. Pt has sustained HR in 40s for most of shift.   0720: Dr. Mell Leone at bedside and updated on patient status. Orders received to hold amlodipine until Monday; plan to discharge after ECHO. Removed LIAM drain. 2321: Bedside shift change report given to PATRICK RN & Skyler Dominique RN (oncoming nurse) by Chey Liao RN (offgoing nurse). Report included the following information SBAR, Kardex, Procedure Summary, Intake/Output, MAR, Accordion and Cardiac Rhythm SB. Problem: Falls - Risk of  Goal: *Absence of Falls  Description  Document Steffany Gopal Fall Risk and appropriate interventions in the flowsheet.   Outcome: Progressing Towards Goal  Note:   Fall Risk Interventions:  Mobility Interventions: Communicate number of staff needed for ambulation/transfer, Patient to call before getting OOB         Medication Interventions: Evaluate medications/consider consulting pharmacy, Patient to call before getting OOB, Teach patient to arise slowly    Elimination Interventions: Call light in reach, Patient to call for help with toileting needs, Toilet paper/wipes in reach, Toileting schedule/hourly rounds, Urinal in reach

## 2019-03-29 NOTE — TELEPHONE ENCOUNTER
Verified patient with two types of identifiers. Spoke to patient and scheduled his hospital follow up with Dr. Vern Garrett on Monday at [address] [place] [address]. Patient verbalized understanding and will call with any other questions.

## 2019-03-29 NOTE — PROGRESS NOTES
Cardiology Progress Note            Admit Date: 3/28/2019  Admit Diagnosis: Left carotid artery stenosis [I65.22]  Date: 3/29/2019     Time: 1:01 PM    HPI: Mckenna Fernandez is a 70 y.o. male admitted on 3/28/2019  for Left carotid artery stenosis [I65.22]. Has PMH of HTN, PVD (reports he has had stent placed to left leg), carotid disease, tobacco abuse. Left CEA 3/28. SB with HR in 40's and cardiology consulted postop. Uncertain HR at baseline. No hx of chest pain, palpitations, dizziness, SOB. Denies any hx of QUIJANO. Subjective: Denies chest pain, palpitations, SOB,  Dizziness. HR overnight with lows of 39 at night. Asymptomatic. Currently sinus bradycardia rate 40's-50s. Ambulated around nurses station and HR increased to 72. Assessment and Plan   1. Sinus bradycardia: HR lows to 39  -12 lead EKG: NSR, nonspecific t wave abnormality lateral leads  -Uncertain baseline HR prior to surgery  -Pt is asymptomatic and HR increases to 70's with ambulation.  -Will d/w Dr. Stacie Kongeper: how long hold amlodipine    -Echo:   03/28/19   ECHO ADULT COMPLETE 03/29/2019 3/29/2019    Narrative · Estimated left ventricular ejection fraction is 56 - 60%. No regional   wall motion abnormality noted. · Mild mitral valve regurgitation. · Mild aortic valve sclerosis with no evidence of reduced excursion. Signed by: Jone Washburn MD          2. Hx of HTN:  BP currently controlled  -Stop amlodipine  -Restart losartan-HCTZ     3. Carotid artery disease: s/p Left carotid endarterectomy  -on ASA and plavix     Pt seen by Dr. Stacie Kongeper:  Pt with SB post Left CEA. HR low of 39 noted overnight. Now HR 40's-50s and asymptomatic. Uncertain if bradycardia new or his baseline. HR increases to 72 with ambulation. Echo NL EF,  Would restart amlodipine and hold the Losartan-HCTZ (hyzaar). follow up with Dr. Stacie Dubois at [facility] on Monday 4/1/19. Assessment/Plan/Discussion:Cardiology Attending:     Patient seen on the day of progress note and examined  and agree with Advance Practice Provider (MARILU, NP,PA)  assessment and plans. Tuyet Schmidt is a 70 y.o. male   HR now seems fine   Some post carotid vagal tone resolving  Hold Hyzaar for weekend, family to check bp if beatriz then add back  Continue Norvasc  See me Monday at 9 am  No future appointments. Say Hussein MD          Objective:      Physical Exam:                Visit Vitals  BP (!) 123/98 (BP 1 Location: Right arm, BP Patient Position: Sitting)   Pulse 60   Temp 97.6 °F (36.4 °C)   Resp 20   Ht 5' 8\" (1.727 m)   Wt 179 lb 4.8 oz (81.3 kg)   SpO2 95%   BMI 27.26 kg/m²          General Appearance:   Well developed, alert and oriented x 3, and   individual in no acute distress. Ears/Nose/Mouth/Throat:    Hearing grossly normal. Left neck dressing c/d/i. Neck:  Supple. Chest:    Lungs clear to auscultation bilaterally. Cardiovascular:    Regular rate and rhythm, S1, S2 normal, no murmur. Abdomen:    Soft, non-tender,   Extremities:  No edema bilaterally. Skin:  Warm and dry. Telemetry: sinus bradycardia-NSR. Data Review:    Labs:  No results found for this or any previous visit (from the past 24 hour(s)). Radiology:        Current Facility-Administered Medications   Medication Dose Route Frequency    oxyCODONE IR (ROXICODONE) tablet 5 mg  5 mg Oral Q4H PRN    aspirin delayed-release tablet 81 mg  81 mg Oral DAILY    clopidogrel (PLAVIX) tablet 75 mg  75 mg Oral DAILY    PHENYLephrine (SARAH-SYNEPHRINE) 30 mg in 0.9% sodium chloride 250 mL infusion   mcg/min IntraVENous TITRATE    niCARdipine (CARDENE) 25 mg in 0.9% sodium chloride 250 mL infusion  0-15 mg/hr IntraVENous TITRATE    losartan/hydroCHLOROthiazide (HYZAAR) 100/25 mg   Oral DAILY    fentaNYL citrate (PF) injection 50 mcg  50 mcg IntraVENous Q3H PRN          Lady Joe.  [facility] [address] [facility] [address]   [place], [address] [place] [address] Drive   [phone]

## 2019-03-29 NOTE — PROGRESS NOTES
0730: Bedside and Verbal shift change report given to PATRICK, RN and Mei Cheung RN (oncoming nurse) by Krystin Portillo RN (offgoing nurse). Report included the following information SBAR, Kardex, Intake/Output, MAR, Recent Results, Med Rec Status and Cardiac Rhythm SB with 1st degree AVB. 1100: Lisha Rodriguez NP at bedside. 1150: Ambulated x 2 laps with patient around nurses station. HR increased to 74. Pt had no dizziness or lightheadedness at this time. 1410: I have reviewed discharge instructions with the patient. The patient verbalized understanding. Pt discharged with wife and daughter present. VSS. Peripheral IV and arterial line removed.

## 2019-04-01 ENCOUNTER — OFFICE VISIT (OUTPATIENT)
Dept: CARDIOLOGY CLINIC | Age: 79
End: 2019-04-01

## 2019-04-01 VITALS
SYSTOLIC BLOOD PRESSURE: 138 MMHG | HEIGHT: 68 IN | RESPIRATION RATE: 17 BRPM | DIASTOLIC BLOOD PRESSURE: 70 MMHG | OXYGEN SATURATION: 99 % | HEART RATE: 46 BPM | WEIGHT: 179 LBS | BODY MASS INDEX: 27.13 KG/M2

## 2019-04-01 DIAGNOSIS — I65.22 LEFT CAROTID ARTERY STENOSIS: ICD-10-CM

## 2019-04-01 DIAGNOSIS — I10 HYPERTENSION, ESSENTIAL: ICD-10-CM

## 2019-04-01 DIAGNOSIS — R00.1 BRADYCARDIA: Primary | ICD-10-CM

## 2019-04-01 RX ORDER — ASPIRIN 81 MG/1
TABLET ORAL DAILY
COMMUNITY
End: 2022-05-12 | Stop reason: CLARIF

## 2019-04-01 RX ORDER — AMLODIPINE BESYLATE 10 MG/1
TABLET ORAL DAILY
COMMUNITY
End: 2019-04-01

## 2019-04-01 RX ORDER — AMLODIPINE BESYLATE 10 MG/1
10 TABLET ORAL DAILY
Qty: 90 TAB | Refills: 2 | Status: SHIPPED | OUTPATIENT
Start: 2019-04-01 | End: 2019-12-10 | Stop reason: SDUPTHER

## 2019-04-01 RX ORDER — LOSARTAN POTASSIUM AND HYDROCHLOROTHIAZIDE 25; 100 MG/1; MG/1
1 TABLET ORAL DAILY
COMMUNITY
End: 2022-05-12 | Stop reason: CLARIF

## 2019-04-01 RX ORDER — CILOSTAZOL 100 MG/1
TABLET ORAL
COMMUNITY
End: 2022-05-12 | Stop reason: CLARIF

## 2019-04-01 RX ORDER — CLOPIDOGREL BISULFATE 75 MG/1
TABLET ORAL
COMMUNITY
End: 2022-05-12 | Stop reason: CLARIF

## 2019-04-01 NOTE — Clinical Note
5/5/19 Patient: Memo Patel YOB: 1940 Date of Visit: 4/1/2019 Tunde Diggs NP 
[address] [facility] [address] VIA Facsimile: [phone] Dear Tunde Diggs NP, Thank you for referring Mr. Memo Patel to [address]  for evaluation. My notes for this consultation are attached. If you have questions, please do not hesitate to call me. I look forward to following your patient along with you.  
 
 
Sincerely, 
 
Nena Bolaños MD

## 2019-04-01 NOTE — PROGRESS NOTES
Verified patient with two types of identifiers. Verified pharmacy with patient. Verified medications with the patient.  Discontinued medications not current per Dr. Dustin Partida.      Visit Vitals  /70 (BP 1 Location: Left arm, BP Patient Position: Sitting)   Pulse (!) 46   Resp 17   Ht 5' 8\" (1.727 m)   Wt 179 lb (81.2 kg)   SpO2 99%   BMI 27.22 kg/m²

## 2019-04-01 NOTE — PATIENT INSTRUCTIONS
Please restart your amlodipine 10mg daily. Please talk to your vascular surgeon about driving, wait at least one week.

## 2019-04-01 NOTE — PROGRESS NOTES
Scarlet Cerna     1940       Fatoumata Pratt MD, [facility]  Date of Visit-4/1/2019   PCP is Kiana Liang NP   [facility] [place]  [facility]  HPI:  Scarlet Cerna is a 70 y.o. male   Hospital f/u CEA postop had bradycardia. We told him to continue his AmlodipinE and hold Hyzaar. Pt feels \"good\" since returning home. He denies chest pain, edema, syncope or shortness of breath at rest, has no tachycardia, palpitations or sense of arrhythmia since returning home. Assessment/Plan:     1. Bradycardia-often see a bit of vagal response after carotids, help bp med so would not bottom out, watched one night in hospital  admitted on 3/28/2019  for Left carotid artery stenosis [I65.22].  Has PMH of HTN, PVD (reports he has had stent placed to left leg), carotid disease, tobacco abuse. Left CEA 3/28. SB with HR in 40's and cardiology consulted postop. Uncertain HR at baseline  03/28/19   ECHO ADULT COMPLETE 03/29/2019 3/29/2019    Narrative · Estimated left ventricular ejection fraction is 56 - 60%. No regional   wall motion abnormality noted. · Mild mitral valve regurgitation. · Mild aortic valve sclerosis with no evidence of reduced excursion. Signed by: Vitaly Carbajal MD      Seems postop stable. Can restart all BP meds. F/U as needed. He asked about driving, I will refer that to his vascular surgeon. From a CV point of view, I would suggest that he wait about 1 week. 2. HTN stable  Not taking amlodipine, follow up bp with PCP  3. Carotid artery disease: s/p Left carotid endarterectomy  -on ASA and plavix  No future appointments. Patient Instructions   Please restart your amlodipine 10mg daily. Please talk to your vascular surgeon about driving, wait at least one week. Key CAD CHF Meds             aspirin delayed-release 81 mg tablet (Taking) Take  by mouth daily.     cilostazol (PLETAL) 100 mg tablet (Taking) Take  by mouth Before breakfast and dinner. clopidogrel (PLAVIX) 75 mg tab (Taking) Take  by mouth.    losartan-hydroCHLOROthiazide (HYZAAR) 100-25 mg per tablet (Taking) Take 1 Tab by mouth daily. amLODIPine (NORVASC) 10 mg tablet (Taking) Take 1 Tab by mouth daily. Impression:   1. Bradycardia    2. Hypertension, essential    3. Left carotid artery stenosis    Review of Systems   Constitutional: Negative for diaphoresis, fever and malaise/fatigue. HENT: Negative for ear pain, hearing loss, nosebleeds and tinnitus. Eyes: Negative for blurred vision, double vision and pain. Respiratory: Negative for cough, hemoptysis, sputum production, shortness of breath, wheezing and stridor. Cardiovascular: Negative for chest pain, palpitations, orthopnea, claudication and leg swelling. Gastrointestinal: Negative for abdominal pain, blood in stool, constipation, diarrhea, heartburn, melena, nausea and vomiting. Genitourinary: Negative for dysuria, frequency and urgency. Musculoskeletal: Negative for back pain, falls, joint pain, myalgias and neck pain. Skin: Negative for rash. Neurological: Negative for dizziness, sensory change, seizures, loss of consciousness, weakness and headaches. Endo/Heme/Allergies: Does not bruise/bleed easily. Psychiatric/Behavioral: Negative for depression, hallucinations and memory loss. The patient is not nervous/anxious and does not have insomnia. see supplement sheet, initialed and to be scanned by staff  Past Medical History:   Diagnosis Date    Carotid arterial disease ([facility] [address]     Hypertension       NKDA  Social Hx= reports that he has been smoking. He has a 61.00 pack-year smoking history. He has never used smokeless tobacco. He reports that he drank alcohol. He reports that he does not use drugs.    Unsure of family hx    Exam and Labs:  /70 (BP 1 Location: Left arm, BP Patient Position: Sitting)   Pulse (!) 46   Resp 17   Ht 5' 8\" (1.727 m)   Wt 179 lb (81.2 kg)   SpO2 99%   BMI 27.22 kg/m² Constitutional:  NAD, comfortable  Head: NC,AT. Eyes: No scleral icterus. Neck:  Neck supple. No JVD present. Throat: moist mucous membranes. Chest: Effort normal & normal respiratory excursion . Neurological: alert, conversant and oriented . Skin: Skin is not cold. No obvious systemic rash noted. Not diaphoretic. No erythema. Psychiatric:  Grossly normal mood and affect. Behavior appears normal. Extremities:  no clubbing or cyanosis. Abdomen: non distended    Lungs:breath sounds normal. No stridor. distress, wheezes or  Rales. Heart: normal rate, regular rhythm, normal S1, S2, no murmurs, rubs, clicks or gallops , PMI non displaced. Edema: Edema is none. Lab Results   Component Value Date/Time    Sodium 141 03/22/2019 11:50 AM    Potassium 4.2 03/22/2019 11:50 AM    Chloride 108 03/22/2019 11:50 AM    CO2 28 03/22/2019 11:50 AM    Anion gap 5 03/22/2019 11:50 AM    Glucose 84 03/22/2019 11:50 AM    BUN 11 03/22/2019 11:50 AM    Creatinine 1.13 03/22/2019 11:50 AM    BUN/Creatinine ratio 10 (L) 03/22/2019 11:50 AM    GFR est AA >60 03/22/2019 11:50 AM    GFR est non-AA >60 03/22/2019 11:50 AM    Calcium 9.4 03/22/2019 11:50 AM      Wt Readings from Last 3 Encounters:   04/01/19 179 lb (81.2 kg)   03/29/19 179 lb 4.8 oz (81.3 kg)   03/22/19 181 lb (82.1 kg)      BP Readings from Last 3 Encounters:   04/01/19 138/70   03/29/19 (!) 123/98   03/22/19 175/72      Current Outpatient Medications   Medication Sig    aspirin delayed-release 81 mg tablet Take  by mouth daily.  cilostazol (PLETAL) 100 mg tablet Take  by mouth Before breakfast and dinner.  clopidogrel (PLAVIX) 75 mg tab Take  by mouth.  losartan-hydroCHLOROthiazide (HYZAAR) 100-25 mg per tablet Take 1 Tab by mouth daily.  amLODIPine (NORVASC) 10 mg tablet Take 1 Tab by mouth daily.     oxyCODONE IR (ROXICODONE) 5 mg immediate release tablet Take 1 Tab by mouth every four (4) hours as needed for Pain for up to 3 days. Max Daily Amount: 30 mg.    cilostazol (PLETAL) 100 mg tablet Take  by mouth Before breakfast and dinner.  amLODIPine (NORVASC) 10 mg tablet Take 10 mg by mouth daily.  clopidogrel (PLAVIX) 75 mg tab Take 75 mg by mouth.  losartan-hydroCHLOROthiazide (HYZAAR) 100-25 mg per tablet Take 1 Tab by mouth daily.  aspirin delayed-release 81 mg tablet Take 81 mg by mouth daily. No current facility-administered medications for this visit. Impression see above.       Written by Paul Baig, as dictated by Khai Freeman MD.

## 2019-04-13 NOTE — DISCHARGE SUMMARY
Physician Discharge Summary     Patient ID:  Mike Kramer  [id number]  19 y.o.  1940    Admit Date: 3/28/2019    Discharge Date: 4/13/2019    Admission Diagnoses: Left carotid artery stenosis [I65.22]    Discharge Diagnoses: Active Problems:    Left carotid artery stenosis (3/28/2019)         Last Procedure: Procedure(s):  LEFT CAROTID ENDARTERECTOMY      Hospital Course:   Patient underwent an uneventful left carotid endarterectomy on 3/28/19 and was admitted to the CVICU overnight. He did have some asymptomatic bradicardia with no hypotension of blood pressures problems. A cardiology evaluation was obtained to assist with this. An antihypertensive medicine was held post-op. He did well on POD #1 with no abnormalities other than mild bradycardia. He was able to be discharged to home with no other changes in medicines other than the addition of pain medicine and holding Norvasc for the next 3 days. Consults: Cardiology consult    Disposition: home    Patient Instructions: given to patient  Cannot display discharge medications since this patient is not currently admitted. Follow-up with Lamont Yates MD  in 2 weeks.       Signed:  Lamont Yates MD  4/13/2019  2:06 PM

## 2019-12-10 DIAGNOSIS — I10 ESSENTIAL HYPERTENSION: Primary | ICD-10-CM

## 2019-12-11 RX ORDER — AMLODIPINE BESYLATE 10 MG/1
10 TABLET ORAL DAILY
Qty: 90 TAB | Refills: 0 | Status: SHIPPED | OUTPATIENT
Start: 2019-12-11 | End: 2020-09-25

## 2019-12-11 NOTE — TELEPHONE ENCOUNTER
Request for Norvasc 10mg daily. Last office visit 4-1-19, next office visit needs to be scheduled, left message with pharmacy.  Refills per verbal order from Dr. Velasquez Rojas.

## 2020-09-25 DIAGNOSIS — I10 ESSENTIAL HYPERTENSION: ICD-10-CM

## 2020-09-25 RX ORDER — AMLODIPINE BESYLATE 10 MG/1
10 TABLET ORAL DAILY
Qty: 90 TAB | Refills: 0 | Status: SHIPPED | OUTPATIENT
Start: 2020-09-25

## 2020-09-25 NOTE — TELEPHONE ENCOUNTER
Request for norvasc 10mg daily. Last office visit 4-1-19, next office visit needs to be scheduled, left message with pharmacy.  Refills per verbal order from Dr. Jeremy Gibson.

## 2020-12-27 DIAGNOSIS — I10 ESSENTIAL HYPERTENSION: ICD-10-CM

## 2020-12-28 RX ORDER — AMLODIPINE BESYLATE 10 MG/1
TABLET ORAL
Qty: 90 TAB | Refills: 0 | OUTPATIENT
Start: 2020-12-28

## 2021-12-30 ENCOUNTER — HOSPITAL ENCOUNTER (OUTPATIENT)
Dept: PREADMISSION TESTING | Age: 81
Discharge: HOME | End: 2021-12-30
Payer: COMMERCIAL

## 2021-12-30 PROCEDURE — U0005 INFEC AGEN DETEC AMPLI PROBE: HCPCS

## 2021-12-31 LAB
SARS-COV-2, XPLCVT: NOT DETECTED
SOURCE, COVRS: NORMAL

## 2022-03-19 PROBLEM — I65.22 LEFT CAROTID ARTERY STENOSIS: Status: ACTIVE | Noted: 2019-03-28

## 2022-05-12 ENCOUNTER — ANESTHESIA EVENT (OUTPATIENT)
Dept: ENDOSCOPY | Age: 82
End: 2022-05-12
Payer: COMMERCIAL

## 2022-05-12 NOTE — ANESTHESIA PREPROCEDURE EVALUATION
Relevant Problems   No relevant active problems       Anesthetic History   No history of anesthetic complications            Review of Systems / Medical History  Patient summary reviewed, nursing notes reviewed and pertinent labs reviewed    Pulmonary          Smoker      Comments:  Current Every Day Smoker - 61 pack years   Neuro/Psych   Within defined limits           Cardiovascular    Hypertension  Valvular problems/murmurs: mitral insufficiency        CAD and PAD    Exercise tolerance: >4 METS  Comments: Carotid Artery Stenosis s/p Left CEA (3/28/19)    Left lower extremity stent (2017)    TTE (3/28/19):  ·Estimated left ventricular ejection fraction is 56 - 60%. No regional wall motion abnormality noted. ·Mild mitral valve regurgitation. ·Mild aortic valve sclerosis with no evidence of reduced excursion. ECG (3/22/19):   Sinus bradycardia   T wave abnormality, consider lateral ischemia   Abnormal ECG   No previous ECGs available     GI/Hepatic/Renal               Comments: Screening colonoscopy (5/13/22) Endo/Other  Within defined limits           Other Findings              Physical Exam    Airway  Mallampati: II  TM Distance: 4 - 6 cm  Neck ROM: normal range of motion   Mouth opening: Normal     Cardiovascular  Regular rate and rhythm,  S1 and S2 normal,  no murmur, click, rub, or gallop             Dental    Dentition: Full upper dentures, Full lower dentures and Edentulous     Pulmonary  Breath sounds clear to auscultation               Abdominal  GI exam deferred       Other Findings            Anesthetic Plan    ASA: 3  Anesthesia type: MAC          Induction: Intravenous  Anesthetic plan and risks discussed with: Patient

## 2022-05-13 ENCOUNTER — ANESTHESIA (OUTPATIENT)
Dept: ENDOSCOPY | Age: 82
End: 2022-05-13
Payer: COMMERCIAL

## 2022-05-13 ENCOUNTER — HOSPITAL ENCOUNTER (OUTPATIENT)
Age: 82
Setting detail: OUTPATIENT SURGERY
Discharge: HOME | End: 2022-05-13
Attending: SPECIALIST | Admitting: SPECIALIST
Payer: COMMERCIAL

## 2022-05-13 VITALS
HEART RATE: 71 BPM | RESPIRATION RATE: 21 BRPM | WEIGHT: 173.9 LBS | DIASTOLIC BLOOD PRESSURE: 63 MMHG | OXYGEN SATURATION: 100 % | TEMPERATURE: 97.8 F | BODY MASS INDEX: 26.36 KG/M2 | HEIGHT: 68 IN | SYSTOLIC BLOOD PRESSURE: 150 MMHG

## 2022-05-13 PROCEDURE — 77030013992 HC SNR POLYP ENDOSC BSC -B: Performed by: SPECIALIST

## 2022-05-13 PROCEDURE — 74011250636 HC RX REV CODE- 250/636: Performed by: NURSE ANESTHETIST, CERTIFIED REGISTERED

## 2022-05-13 PROCEDURE — 76060000031 HC ANESTHESIA FIRST 0.5 HR: Performed by: SPECIALIST

## 2022-05-13 PROCEDURE — 88305 TISSUE EXAM BY PATHOLOGIST: CPT

## 2022-05-13 PROCEDURE — 74011250636 HC RX REV CODE- 250/636: Performed by: SPECIALIST

## 2022-05-13 PROCEDURE — 74011000250 HC RX REV CODE- 250: Performed by: NURSE ANESTHETIST, CERTIFIED REGISTERED

## 2022-05-13 PROCEDURE — 76040000019: Performed by: SPECIALIST

## 2022-05-13 PROCEDURE — 2709999900 HC NON-CHARGEABLE SUPPLY: Performed by: SPECIALIST

## 2022-05-13 RX ORDER — SODIUM CHLORIDE 0.9 % (FLUSH) 0.9 %
5-40 SYRINGE (ML) INJECTION EVERY 8 HOURS
Status: DISCONTINUED | OUTPATIENT
Start: 2022-05-13 | End: 2022-05-13 | Stop reason: HOSPADM

## 2022-05-13 RX ORDER — DEXTROMETHORPHAN/PSEUDOEPHED 2.5-7.5/.8
1.2 DROPS ORAL
Status: DISCONTINUED | OUTPATIENT
Start: 2022-05-13 | End: 2022-05-13 | Stop reason: HOSPADM

## 2022-05-13 RX ORDER — PHENYLEPHRINE HCL IN 0.9% NACL 0.4MG/10ML
SYRINGE (ML) INTRAVENOUS AS NEEDED
Status: DISCONTINUED | OUTPATIENT
Start: 2022-05-13 | End: 2022-05-13 | Stop reason: HOSPADM

## 2022-05-13 RX ORDER — HYDROCHLOROTHIAZIDE 25 MG/1
25 TABLET ORAL DAILY
COMMUNITY

## 2022-05-13 RX ORDER — GLUCOSAMINE SULFATE 1500 MG
POWDER IN PACKET (EA) ORAL DAILY
COMMUNITY

## 2022-05-13 RX ORDER — PROPOFOL 10 MG/ML
INJECTION, EMULSION INTRAVENOUS AS NEEDED
Status: DISCONTINUED | OUTPATIENT
Start: 2022-05-13 | End: 2022-05-13 | Stop reason: HOSPADM

## 2022-05-13 RX ORDER — SODIUM CHLORIDE 0.9 % (FLUSH) 0.9 %
5-40 SYRINGE (ML) INJECTION AS NEEDED
Status: DISCONTINUED | OUTPATIENT
Start: 2022-05-13 | End: 2022-05-13 | Stop reason: HOSPADM

## 2022-05-13 RX ORDER — GLYCOPYRROLATE 0.2 MG/ML
INJECTION INTRAMUSCULAR; INTRAVENOUS AS NEEDED
Status: DISCONTINUED | OUTPATIENT
Start: 2022-05-13 | End: 2022-05-13 | Stop reason: HOSPADM

## 2022-05-13 RX ORDER — EPHEDRINE SULFATE/0.9% NACL/PF 50 MG/5 ML
SYRINGE (ML) INTRAVENOUS AS NEEDED
Status: DISCONTINUED | OUTPATIENT
Start: 2022-05-13 | End: 2022-05-13 | Stop reason: HOSPADM

## 2022-05-13 RX ORDER — LIDOCAINE HYDROCHLORIDE 20 MG/ML
INJECTION, SOLUTION EPIDURAL; INFILTRATION; INTRACAUDAL; PERINEURAL AS NEEDED
Status: DISCONTINUED | OUTPATIENT
Start: 2022-05-13 | End: 2022-05-13 | Stop reason: HOSPADM

## 2022-05-13 RX ORDER — SODIUM CHLORIDE 9 MG/ML
50 INJECTION, SOLUTION INTRAVENOUS CONTINUOUS
Status: DISCONTINUED | OUTPATIENT
Start: 2022-05-13 | End: 2022-05-13 | Stop reason: HOSPADM

## 2022-05-13 RX ADMIN — Medication 10 MG: at 08:33

## 2022-05-13 RX ADMIN — GLYCOPYRROLATE 0.2 MG: 0.2 INJECTION, SOLUTION INTRAMUSCULAR; INTRAVENOUS at 08:26

## 2022-05-13 RX ADMIN — LIDOCAINE HYDROCHLORIDE 50 MG: 20 INJECTION, SOLUTION EPIDURAL; INFILTRATION; INTRACAUDAL; PERINEURAL at 08:22

## 2022-05-13 RX ADMIN — PROPOFOL 30 MG: 10 INJECTION, EMULSION INTRAVENOUS at 08:26

## 2022-05-13 RX ADMIN — Medication 40 MCG: at 08:38

## 2022-05-13 RX ADMIN — PROPOFOL 70 MG: 10 INJECTION, EMULSION INTRAVENOUS at 08:22

## 2022-05-13 RX ADMIN — SODIUM CHLORIDE 50 ML/HR: 9 INJECTION, SOLUTION INTRAVENOUS at 08:00

## 2022-05-13 NOTE — ANESTHESIA POSTPROCEDURE EVALUATION
Procedure(s):  COLONOSCOPY  ENDOSCOPIC POLYPECTOMY. MAC    Anesthesia Post Evaluation        Patient location during evaluation: PACU  Note status: Adequate. Level of consciousness: responsive to verbal stimuli and sleepy but conscious  Pain management: satisfactory to patient  Airway patency: patent  Anesthetic complications: no  Cardiovascular status: acceptable  Respiratory status: acceptable  Hydration status: acceptable  Comments: +Post-Anesthesia Evaluation and Assessment    Patient: Angelito Thornton MRN: [id number]  SSN: [id number]   YOB: 1940  Age: 80 y.o. Sex: male      Cardiovascular Function/Vital Signs    /60   Pulse 69   Temp 36.6 °C (97.8 °F)   Resp 20   Ht 5' 8\" (1.727 m)   Wt 78.9 kg (173 lb 14.4 oz)   SpO2 100%   BMI 26.44 kg/m²     Patient is status post Procedure(s):  COLONOSCOPY  ENDOSCOPIC POLYPECTOMY. Nausea/Vomiting: Controlled. Postoperative hydration reviewed and adequate. Pain:  Pain Scale 1: Numeric (0 - 10) (05/13/22 0842)  Pain Intensity 1: 0 (05/13/22 0842)   Managed. Neurological Status: At baseline. Mental Status and Level of Consciousness: Arousable. Pulmonary Status:   O2 Device: None (Room air) (05/13/22 0842)   Adequate oxygenation and airway patent. Complications related to anesthesia: None    Post-anesthesia assessment completed. No concerns. Signed By: Prema Peters MD    5/13/2022  Post anesthesia nausea and vomiting:  controlled      INITIAL Post-op Vital signs: No vitals data found for the desired time range.

## 2022-05-13 NOTE — H&P
Gastroenterology Outpatient History and Physical    Patient: Tennille Mendoza    Physician: Po Agrawal MD    Vital Signs: Blood pressure (!) 192/53, pulse (!) 51, temperature 97.8 °F (36.6 °C), resp. rate 14, height 5' 8\" (1.727 m), weight 78.9 kg (173 lb 14.4 oz), SpO2 100 %. Allergies: No Known Allergies    Chief Complaint: H/O Polyps    History of Present Illness: 79 yo BM for h/o colon polyps. Justification for Procedure: above    History:  Past Medical History:   Diagnosis Date    Carotid arterial disease ([address]     Hypertension       Past Surgical History:   Procedure Laterality Date    VASCULAR SURGERY PROCEDURE UNLIST  2017    STENT IN LEFT LEG      Social History     Socioeconomic History    Marital status:    Tobacco Use    Smoking status: Current Every Day Smoker     Packs/day: 1.00     Years: 61.00     Pack years: 61.00    Smokeless tobacco: Never Used   Substance and Sexual Activity    Alcohol use: Not Currently    Drug use: Never      Family History   Problem Relation Age of Onset    OSTEOARTHRITIS Mother     Heart Disease Mother     Heart Disease Father     No Known Problems Sister     No Known Problems Brother     No Known Problems Sister     Anesth Problems Neg Hx        Medications:   Prior to Admission medications    Medication Sig Start Date End Date Taking? Authorizing Provider   hydroCHLOROthiazide (HYDRODIURIL) 25 mg tablet Take 25 mg by mouth daily. Yes Provider, Historical   cholecalciferol (Vitamin D3) 25 mcg (1,000 unit) cap Take  by mouth daily. Yes Provider, Historical   amLODIPine (NORVASC) 10 mg tablet Take 1 Tab by mouth daily. 9/25/20  Yes Jean Pierre Dumont MD   cilostazol (PLETAL) 100 mg tablet Take  by mouth Before breakfast and dinner. Yes Provider, Historical   losartan-hydroCHLOROthiazide (HYZAAR) 100-25 mg per tablet Take 1 Tab by mouth daily. Yes Provider, Historical   aspirin delayed-release 81 mg tablet Take 81 mg by mouth daily. Yes Provider, Historical       Physical Exam:   General: alert, no distress   HEENT: Head: Normocephalic, no lesions, without obvious abnormality.    Heart: regular rate and rhythm, S1, S2 normal, no murmur, click, rub or gallop   Lungs: chest clear, no wheezing, rales, normal symmetric air entry   Abdominal: soft, NT/ND+ BS   Neurological: Grossly normal   Extremities: extremities normal, atraumatic, no cyanosis or edema     Findings/Diagnosis: H/O Polyp    Plan of Care/Planned Procedure: Colonoscopy

## 2022-05-13 NOTE — PROCEDURES
Colonoscopy Procedure Note    Indications:   Personal history of colon polyps (screening only)    Referring Physician: Joseph Skinner NP  Anesthesia/Sedation: MAC anesthesia Propofol  Endoscopist:  Dr. Zofia Fournier    Procedure in Detail:  Informed consent was obtained for the procedure, including sedation. Risks of perforation, hemorrhage, adverse drug reaction, and aspiration were discussed. The patient was placed in the left lateral decubitus position. Based on the pre-procedure assessment, including review of the patient's medical history, medications, allergies, and review of systems, he had been deemed to be an appropriate candidate for moderate sedation; he was therefore sedated with the medications listed above. The patient was monitored continuously with ECG tracing, pulse oximetry, blood pressure monitoring, and direct observations. A rectal examination was performed. The [id number] was inserted into the rectum and advanced under direct vision to the cecum, which was identified by the ileocecal valve and appendiceal orifice. The quality of the colonic preparation was adequate. A careful inspection was made as the colonoscope was withdrawn, including a retroflexed view of the rectum; findings and interventions are described below. Appropriate photodocumentation was obtained. Findings:   1. Scope advanced to the cecum. 2.  Preparation was adequate. 3.  (1) sessile 5 mm polyp at ileocecal valve s/p cold snare removal.  4.  Mild diverticulosis of sigmoid colon. Therapies:  See above    Specimen: Specimens were collected as described above and sent to pathology. Complications: None were encountered during the procedure. EBL: < 10 ml    Recommendations:   -F/U path  -repeat colonoscopy interval depending on path results.     Signed By: Kevin Boland MD                        May 13, 2022

## 2022-11-16 ENCOUNTER — OFFICE VISIT (OUTPATIENT)
Dept: CARDIOLOGY CLINIC | Age: 82
End: 2022-11-16
Payer: COMMERCIAL

## 2022-11-16 VITALS
OXYGEN SATURATION: 98 % | HEIGHT: 68 IN | WEIGHT: 171 LBS | DIASTOLIC BLOOD PRESSURE: 58 MMHG | SYSTOLIC BLOOD PRESSURE: 176 MMHG | HEART RATE: 78 BPM | BODY MASS INDEX: 25.91 KG/M2 | RESPIRATION RATE: 16 BRPM

## 2022-11-16 DIAGNOSIS — I10 ESSENTIAL HYPERTENSION: ICD-10-CM

## 2022-11-16 DIAGNOSIS — I73.9 PAD (PERIPHERAL ARTERY DISEASE) (HCC): ICD-10-CM

## 2022-11-16 DIAGNOSIS — Z72.0 TOBACCO USE: ICD-10-CM

## 2022-11-16 DIAGNOSIS — I10 RESISTANT HYPERTENSION: Primary | ICD-10-CM

## 2022-11-16 DIAGNOSIS — Z98.890 HISTORY OF CEA (CAROTID ENDARTERECTOMY): ICD-10-CM

## 2022-11-16 DIAGNOSIS — I10 RESISTANT HYPERTENSION: ICD-10-CM

## 2022-11-16 PROCEDURE — G8510 SCR DEP NEG, NO PLAN REQD: HCPCS | Performed by: INTERNAL MEDICINE

## 2022-11-16 PROCEDURE — 3074F SYST BP LT 130 MM HG: CPT | Performed by: INTERNAL MEDICINE

## 2022-11-16 PROCEDURE — 1123F ACP DISCUSS/DSCN MKR DOCD: CPT | Performed by: INTERNAL MEDICINE

## 2022-11-16 PROCEDURE — 1101F PT FALLS ASSESS-DOCD LE1/YR: CPT | Performed by: INTERNAL MEDICINE

## 2022-11-16 PROCEDURE — G8427 DOCREV CUR MEDS BY ELIG CLIN: HCPCS | Performed by: INTERNAL MEDICINE

## 2022-11-16 PROCEDURE — 3078F DIAST BP <80 MM HG: CPT | Performed by: INTERNAL MEDICINE

## 2022-11-16 PROCEDURE — 99204 OFFICE O/P NEW MOD 45 MIN: CPT | Performed by: INTERNAL MEDICINE

## 2022-11-16 PROCEDURE — G8417 CALC BMI ABV UP PARAM F/U: HCPCS | Performed by: INTERNAL MEDICINE

## 2022-11-16 PROCEDURE — G8536 NO DOC ELDER MAL SCRN: HCPCS | Performed by: INTERNAL MEDICINE

## 2022-11-16 RX ORDER — CLONIDINE 0.1 MG/24H
1 PATCH, EXTENDED RELEASE TRANSDERMAL
COMMUNITY

## 2022-11-16 RX ORDER — SPIRONOLACTONE 25 MG/1
25 TABLET ORAL DAILY
Qty: 90 TABLET | Refills: 0 | Status: SHIPPED | OUTPATIENT
Start: 2022-11-16

## 2022-11-16 RX ORDER — AMLODIPINE BESYLATE 10 MG/1
10 TABLET ORAL
Qty: 90 TABLET | Refills: 0 | Status: SHIPPED | OUTPATIENT
Start: 2022-11-16

## 2022-11-16 NOTE — PATIENT INSTRUCTIONS
Please get your bloodwork done as soon as you can.   Your Echocardiogram will be schedule after today's visit

## 2022-11-16 NOTE — PROGRESS NOTES
[address]: [facility] [employer]  [phone]    History of Present Illness:  Mr. Cara Galan is an [de-identified] yo M with a history of PAD, status post CEA, followed by vascular, also says he has had work done on his leg before, history of tobacco use, one pack per day, resistant hypertension, referred by his primary care physician for difficult to control blood pressure. He notes his blood pressure has probably been up for \"quite some time\", mostly when he checks it at home it is in the 160s. It is 170/58 here. From a symptom standpoint, he denies any exertional chest pain, shortness of breath, palpitations, lightheadedness or dizziness. He is compensated on exam with clear lungs. He does have trace lower extremity edema. Soc hx. Tob 1 ppd  Fam hx. CAD but no premature  Assessment and Plan:   1. Resistant hypertension. Unclear etiology and will request labs from his primary care physician. Will also obtain metanephrines, renin aldosterone to evaluate for secondary hypertension, as well as an echocardiogram.  Will have him continue his Clonidine and Losartan and Hydrochlorothiazide. He will take this in the morning, but move his Norvasc to the evening. Will also get him started on Spironolactone 25 mg that he will take in the morning and can titrate this up as needed. Will have him follow back in one month. 2. Tobacco use. Stressed the importance of cessation. 3. PAD. Followed by vascular. He has had a CEA as well as work done on his leg before. He  has a past medical history of Carotid arterial disease ([address] and Hypertension. All other systems negative except as above. PE  Vitals:    11/16/22 1447 11/16/22 1511   BP: (!) 200/82 (!) 176/58   Pulse: 78    Resp: 16    SpO2: 98%    Weight: 171 lb (77.6 kg)    Height: 5' 8\" (1.727 m)     Body mass index is 26 kg/m².   General appearance - alert, well appearing, and in no distress  Mental status - affect appropriate to mood  Eyes - sclera anicteric, moist mucous membranes  Neck - supple, no JVD  Chest - clear to auscultation, no wheezes, rales or rhonchi  Heart - normal rate, regular rhythm, normal S1, S2, no murmurs, rubs, clicks or gallops  Abdomen - soft, nontender, nondistended, no masses or organomegaly  Back exam - full range of motion, no tenderness  Neurological - no focal deficits  Extremities - peripheral pulses normal, no pedal edema      Recent Labs:  No results found for: CHOL, CHOLX, CHLST, CHOLV, 676753, HDL, HDLP, LDL, LDLC, DLDLP, TGLX, TRIGL, TRIGP, CHHD, CHHDX  Lab Results   Component Value Date/Time    Creatinine 1.13 03/22/2019 11:50 AM     Lab Results   Component Value Date/Time    BUN 11 03/22/2019 11:50 AM     Lab Results   Component Value Date/Time    Potassium 4.2 03/22/2019 11:50 AM     No results found for: HBA1C, AGQ3XWCB, PBH9HBLQ  Lab Results   Component Value Date/Time    HGB 14.6 03/22/2019 11:50 AM     Lab Results   Component Value Date/Time    PLATELET 156 54/93/3547 11:50 AM       Reviewed:  Past Medical History:   Diagnosis Date    Carotid arterial disease (HCC)     Hypertension      Social History     Tobacco Use   Smoking Status Every Day    Packs/day: 1.00    Years: 61.00    Pack years: 61.00    Types: Cigarettes   Smokeless Tobacco Never     Social History     Substance and Sexual Activity   Alcohol Use Not Currently     No Known Allergies    Current Outpatient Medications   Medication Sig    cloNIDine (CATAPRES) 0.1 mg/24 hr ptwk 1 Patch by TransDERmal route every seven (7) days. cholecalciferol (VITAMIN D3) 25 mcg (1,000 unit) cap Take  by mouth daily. amLODIPine (NORVASC) 10 mg tablet Take 1 Tab by mouth daily. cilostazol (PLETAL) 100 mg tablet Take  by mouth Before breakfast and dinner. losartan-hydroCHLOROthiazide (HYZAAR) 100-25 mg per tablet Take 1 Tab by mouth daily. aspirin delayed-release 81 mg tablet Take 81 mg by mouth two (2) times a day.     hydroCHLOROthiazide (HYDRODIURIL) 25 mg tablet Take 25 mg by mouth daily. No current facility-administered medications for this visit.        Arlin Paula MD  [address] [facility] heart and Vascular [place]  [facility], [address] [facility] [address]  [address]

## 2022-11-16 NOTE — LETTER
Patient:  Srini Mayo   YOB: 1940  Date of Visit: 11/16/2022      Dear Elian Cotto, DEION  [phone] [facility] [address]  Via Fax: [phone]: Thank you for referring Mr. Srini Mayo to me for evaluation/treatment. Below are the relevant portions of my assessment and plan of care. Mr. Russell is an [de-identified] yo M with a history of PAD, status post CEA, followed by vascular, also says he has had work done on his leg before, history of tobacco use, one pack per day, resistant hypertension, referred by his primary care physician for difficult to control blood pressure. He notes his blood pressure has probably been up for \"quite some time\", mostly when he checks it at home it is in the 160s. It is 170/58 here. From a symptom standpoint, he denies any exertional chest pain, shortness of breath, palpitations, lightheadedness or dizziness. He is compensated on exam with clear lungs. He does have trace lower extremity edema. Soc hx. Tob 1 ppd  Fam hx. CAD but no premature  Assessment and Plan:   1. Resistant hypertension. Unclear etiology and will request labs from his primary care physician. Will also obtain metanephrines, renin aldosterone to evaluate for secondary hypertension, as well as an echocardiogram.  Will have him continue his Clonidine and Losartan and Hydrochlorothiazide. He will take this in the morning, but move his Norvasc to the evening. Will also get him started on Spironolactone 25 mg that he will take in the morning and can titrate this up as needed. Will have him follow back in one month. 2. Tobacco use. Stressed the importance of cessation. 3. PAD. Followed by vascular. He has had a CEA as well as work done on his leg before. If you have questions, please do not hesitate to call me.     Sincerely,      Lorrie Cook MD

## 2022-11-22 ENCOUNTER — TELEPHONE (OUTPATIENT)
Dept: CARDIOLOGY CLINIC | Age: 82
End: 2022-11-22

## 2022-11-22 LAB
METANEPH FREE SERPL-MCNC: <10 PG/ML (ref 0–88)
NORMETANEPHRINE SERPL-MCNC: 37 PG/ML (ref 0–285.2)

## 2022-11-22 NOTE — TELEPHONE ENCOUNTER
----- Message from Benita Kirby MD sent at 11/22/2022  7:49 AM EST -----  Please let pt know labs were normal. Thx    Attempted to reach patient. Left voicemail for return call.

## 2022-11-23 LAB
ALDOST SERPL-MCNC: 1.5 NG/DL (ref 0–30)
ALDOST/RENIN PLAS-RTO: 0.5 {RATIO} (ref 0–30)
RENIN PLAS-CCNC: 2.79 NG/ML/HR (ref 0.17–5.38)

## 2022-11-23 NOTE — TELEPHONE ENCOUNTER
Spoke with wife, Nieves Fernandez, confirmed listed on PHI. Verified patient using two identifiers. Advised that per Dr. Ayanna Cochran, lab work was normal. Verbalized understanding, no further questions.

## 2022-11-28 ENCOUNTER — TELEPHONE (OUTPATIENT)
Dept: CARDIOLOGY CLINIC | Age: 82
End: 2022-11-28

## 2022-11-28 NOTE — TELEPHONE ENCOUNTER
MD Leola Dao, RN  Cc: Amanda Garcia RN  Please let pt know labs were normal. Thx    Two patient identifiers verified. Per MD patient called and given results. Confirmed follow up. Patient verbalized understanding and denies any further questions or concerns at this time.     Results given to wife on PHI    Future Appointments   Date Time Provider Hernandez Fernandes   12/27/2022  9:20 AM MD VIRAJ Dao AMB   1/6/2023  2:00 PM SHARONDA GONZALES AMB

## 2022-12-27 ENCOUNTER — OFFICE VISIT (OUTPATIENT)
Dept: CARDIOLOGY CLINIC | Age: 82
End: 2022-12-27
Payer: COMMERCIAL

## 2022-12-27 VITALS
DIASTOLIC BLOOD PRESSURE: 70 MMHG | WEIGHT: 171 LBS | RESPIRATION RATE: 16 BRPM | HEIGHT: 68 IN | BODY MASS INDEX: 25.91 KG/M2 | SYSTOLIC BLOOD PRESSURE: 202 MMHG | OXYGEN SATURATION: 100 % | HEART RATE: 58 BPM

## 2022-12-27 DIAGNOSIS — I44.7 LBBB (LEFT BUNDLE BRANCH BLOCK): ICD-10-CM

## 2022-12-27 DIAGNOSIS — I73.9 PAD (PERIPHERAL ARTERY DISEASE) (HCC): ICD-10-CM

## 2022-12-27 DIAGNOSIS — I10 RESISTANT HYPERTENSION: Primary | ICD-10-CM

## 2022-12-27 DIAGNOSIS — Z72.0 TOBACCO USE: ICD-10-CM

## 2022-12-27 NOTE — PROGRESS NOTES
[address]: Ayanna Cochran  [phone]    History of Present Illness:  Mr. Selene Tsai is an [de-identified] yo M with a history of PAD, status post CEA, followed by vascular, also says he has had work done on his leg before, history of tobacco use, one pack per day, resistant hypertension. On his last visit due to resistant hypertension, had him obtain blood work to look for secondary cause of hypertension and these were normal.  Also added spironolactone to try to help settle his blood pressure down. I did ask him to move his amlodipine to the evening. He has tolerated this well. His blood pressures at home are in the 544'V systolic. It is elevated here, consistent with white coat hypertension. Otherwise, denies any exertional chest pain. His breathing has been stable. No significant palpitations. He is compensated on exam with clear lungs and no lower extremity edema. Soc hx. Tob 1 ppd  Fam hx. CAD but no premature  Assessment/Plan:   1. Resistant hypertension. Blood pressures at home overall okay in the 997'C systolic. He tolerates spironolactone well and will continue this. No adjustments at this point. Will have him follow back in six months. He does have white coat hypertension. 2. Tobacco use, continue cessation. 3. PAD, followed by vascular. He has had a CEA as well as intervention on his lower extremity before. He  has a past medical history of Carotid arterial disease ([address] and Hypertension. All other systems negative except as above. PE  Vitals:    12/27/22 0902   BP: (!) 202/70   Pulse: (!) 58   Resp: 16   SpO2: 100%   Weight: 171 lb (77.6 kg)   Height: 5' 8\" (1.727 m)      Body mass index is 26 kg/m².   General appearance - alert, well appearing, and in no distress  Mental status - affect appropriate to mood  Eyes - sclera anicteric, moist mucous membranes  Neck - supple, no JVD  Chest - clear to auscultation, no wheezes, rales or rhonchi  Heart - normal rate, regular rhythm, normal S1, S2, no murmurs, rubs, clicks or gallops  Abdomen - soft, nontender, nondistended, no masses or organomegaly  Back exam - full range of motion, no tenderness  Neurological - no focal deficits  Extremities - peripheral pulses normal, no pedal edema      Recent Labs:  No results found for: CHOL, CHOLX, CHLST, CHOLV, 456033, HDL, HDLP, LDL, LDLC, DLDLP, TGLX, TRIGL, TRIGP, CHHD, CHHDX  Lab Results   Component Value Date/Time    Creatinine 1.13 03/22/2019 11:50 AM     Lab Results   Component Value Date/Time    BUN 11 03/22/2019 11:50 AM     Lab Results   Component Value Date/Time    Potassium 4.2 03/22/2019 11:50 AM     No results found for: HBA1C, STY8HNLL, TOR7CFQH  Lab Results   Component Value Date/Time    HGB 14.6 03/22/2019 11:50 AM     Lab Results   Component Value Date/Time    PLATELET 285 45/06/6586 11:50 AM       Reviewed:  Past Medical History:   Diagnosis Date    Carotid arterial disease ([facility] [address]     Hypertension      Social History     Tobacco Use   Smoking Status Every Day    Packs/day: 1.00    Years: 61.00    Pack years: 61.00    Types: Cigarettes   Smokeless Tobacco Never     Social History     Substance and Sexual Activity   Alcohol Use Not Currently     No Known Allergies    Current Outpatient Medications   Medication Sig    cloNIDine (CATAPRES) 0.1 mg/24 hr ptwk 1 Patch by TransDERmal route every seven (7) days. amLODIPine (NORVASC) 10 mg tablet Take 1 Tablet by mouth nightly. spironolactone (ALDACTONE) 25 mg tablet Take 1 Tablet by mouth daily. hydroCHLOROthiazide (HYDRODIURIL) 25 mg tablet Take 25 mg by mouth daily. cholecalciferol (VITAMIN D3) 25 mcg (1,000 unit) cap Take  by mouth daily. cilostazol (PLETAL) 100 mg tablet Take  by mouth Before breakfast and dinner. losartan-hydroCHLOROthiazide (HYZAAR) 100-25 mg per tablet Take 1 Tab by mouth daily. aspirin delayed-release 81 mg tablet Take 81 mg by mouth two (2) times a day.      No current facility-administered medications for this visit.        MD Issa Disla heart and Vascular [place]  [facility] [address], [address] [facility] [address]  [address]

## 2022-12-27 NOTE — LETTER
Patient:  Jhonny Concepcion   YOB: 1940  Date of Visit: 12/27/2022      Dear Karina Colon, DEION  [address]  [address] [address] [facility] [address]  Via Fax: [phone]:      Mr. Katt Rider is an [de-identified] yo M with a history of PAD, status post CEA, followed by vascular, also says he has had work done on his leg before, history of tobacco use, one pack per day, resistant hypertension. On his last visit due to resistant hypertension, had him obtain blood work to look for secondary cause of hypertension and these were normal.  Also added spironolactone to try to help settle his blood pressure down. I did ask him to move his amlodipine to the evening. He has tolerated this well. His blood pressures at home are in the 138'N systolic. It is elevated here, consistent with white coat hypertension. Otherwise, denies any exertional chest pain. His breathing has been stable. No significant palpitations. He is compensated on exam with clear lungs and no lower extremity edema. Soc hx. Tob 1 ppd  Fam hx. CAD but no premature  Assessment/Plan:   1. Resistant hypertension. Blood pressures at home overall okay in the 868'T systolic. He tolerates spironolactone well and will continue this. No adjustments at this point. Will have him follow back in six months. He does have white coat hypertension. 2. Tobacco use, continue cessation. 3. PAD, followed by vascular. He has had a CEA as well as intervention on his lower extremity before. If you have questions, please do not hesitate to call me.       Sincerely,      Molly Polk MD

## 2023-02-01 ENCOUNTER — ANCILLARY PROCEDURE (OUTPATIENT)
Dept: CARDIOLOGY CLINIC | Age: 83
End: 2023-02-01
Payer: COMMERCIAL

## 2023-02-01 ENCOUNTER — TELEPHONE (OUTPATIENT)
Dept: CARDIOLOGY CLINIC | Age: 83
End: 2023-02-01

## 2023-02-01 VITALS — WEIGHT: 171 LBS | BODY MASS INDEX: 25.91 KG/M2 | HEIGHT: 68 IN

## 2023-02-01 DIAGNOSIS — I10 RESISTANT HYPERTENSION: ICD-10-CM

## 2023-02-01 DIAGNOSIS — Z72.0 TOBACCO USE: ICD-10-CM

## 2023-02-01 DIAGNOSIS — Z98.890 HISTORY OF CEA (CAROTID ENDARTERECTOMY): ICD-10-CM

## 2023-02-01 DIAGNOSIS — I73.9 PAD (PERIPHERAL ARTERY DISEASE) (HCC): ICD-10-CM

## 2023-02-01 LAB
ECHO AO ASC DIAM: 3 CM
ECHO AO ASCENDING AORTA INDEX: 1.57 CM/M2
ECHO AO ROOT DIAM: 3.1 CM
ECHO AO ROOT INDEX: 1.62 CM/M2
ECHO AV AREA PEAK VELOCITY: 2.4 CM2
ECHO AV AREA VTI: 2.9 CM2
ECHO AV AREA/BSA PEAK VELOCITY: 1.3 CM2/M2
ECHO AV AREA/BSA VTI: 1.5 CM2/M2
ECHO AV MEAN GRADIENT: 4 MMHG
ECHO AV MEAN VELOCITY: 1 M/S
ECHO AV PEAK GRADIENT: 7 MMHG
ECHO AV PEAK VELOCITY: 1.3 M/S
ECHO AV VELOCITY RATIO: 0.77
ECHO AV VTI: 23.7 CM
ECHO LA DIAMETER INDEX: 1.99 CM/M2
ECHO LA DIAMETER: 3.8 CM
ECHO LA TO AORTIC ROOT RATIO: 1.23
ECHO LA VOL 2C: 79 ML (ref 18–58)
ECHO LA VOL 4C: 55 ML (ref 18–58)
ECHO LA VOL BP: 70 ML (ref 18–58)
ECHO LA VOL/BSA BIPLANE: 37 ML/M2 (ref 16–34)
ECHO LA VOLUME AREA LENGTH: 75 ML
ECHO LA VOLUME INDEX A2C: 41 ML/M2 (ref 16–34)
ECHO LA VOLUME INDEX A4C: 29 ML/M2 (ref 16–34)
ECHO LA VOLUME INDEX AREA LENGTH: 39 ML/M2 (ref 16–34)
ECHO LV E' LATERAL VELOCITY: 6 CM/S
ECHO LV E' SEPTAL VELOCITY: 3 CM/S
ECHO LV EDV A2C: 62 ML
ECHO LV EDV A4C: 66 ML
ECHO LV EDV BP: 65 ML (ref 67–155)
ECHO LV EDV INDEX A4C: 35 ML/M2
ECHO LV EDV INDEX BP: 34 ML/M2
ECHO LV EDV NDEX A2C: 32 ML/M2
ECHO LV EJECTION FRACTION A2C: 50 %
ECHO LV EJECTION FRACTION A4C: 59 %
ECHO LV EJECTION FRACTION BIPLANE: 56 % (ref 55–100)
ECHO LV ESV A2C: 31 ML
ECHO LV ESV A4C: 27 ML
ECHO LV ESV BP: 29 ML (ref 22–58)
ECHO LV ESV INDEX A2C: 16 ML/M2
ECHO LV ESV INDEX A4C: 14 ML/M2
ECHO LV ESV INDEX BP: 15 ML/M2
ECHO LV FRACTIONAL SHORTENING: 39 % (ref 28–44)
ECHO LV INTERNAL DIMENSION DIASTOLE INDEX: 2.41 CM/M2
ECHO LV INTERNAL DIMENSION DIASTOLIC: 4.6 CM (ref 4.2–5.9)
ECHO LV INTERNAL DIMENSION SYSTOLIC INDEX: 1.47 CM/M2
ECHO LV INTERNAL DIMENSION SYSTOLIC: 2.8 CM
ECHO LV IVSD: 1.4 CM (ref 0.6–1)
ECHO LV MASS 2D: 270.6 G (ref 88–224)
ECHO LV MASS INDEX 2D: 141.7 G/M2 (ref 49–115)
ECHO LV POSTERIOR WALL DIASTOLIC: 1.5 CM (ref 0.6–1)
ECHO LV RELATIVE WALL THICKNESS RATIO: 0.65
ECHO LVOT AREA: 3.1 CM2
ECHO LVOT AV VTI INDEX: 0.89
ECHO LVOT DIAM: 2 CM
ECHO LVOT MEAN GRADIENT: 3 MMHG
ECHO LVOT PEAK GRADIENT: 4 MMHG
ECHO LVOT PEAK VELOCITY: 1 M/S
ECHO LVOT STROKE VOLUME INDEX: 34.7 ML/M2
ECHO LVOT SV: 66.3 ML
ECHO LVOT VTI: 21.1 CM
ECHO MV A VELOCITY: 0.73 M/S
ECHO MV E DECELERATION TIME (DT): 367.2 MS
ECHO MV E VELOCITY: 0.45 M/S
ECHO MV E/A RATIO: 0.62
ECHO MV E/E' LATERAL: 7.5
ECHO MV E/E' RATIO (AVERAGED): 11.25
ECHO MV E/E' SEPTAL: 15
ECHO PULMONARY ARTERY END DIASTOLIC PRESSURE: 7 MMHG
ECHO PV MAX VELOCITY: 1.5 M/S
ECHO PV PEAK GRADIENT: 9 MMHG
ECHO PV REGURGITANT MAX VELOCITY: 1.3 M/S
ECHO RA MINOR AXIS INDEX: 1.99 CM/M2
ECHO RA MINOR AXIS: 3.8 CM
ECHO RV INTERNAL DIMENSION: 3.7 CM
ECHO RV TAPSE: 2 CM (ref 1.7–?)
ECHO TV REGURGITANT MAX VELOCITY: 2.78 M/S
ECHO TV REGURGITANT PEAK GRADIENT: 31 MMHG

## 2023-02-01 PROCEDURE — 93306 TTE W/DOPPLER COMPLETE: CPT | Performed by: INTERNAL MEDICINE

## 2023-02-01 NOTE — TELEPHONE ENCOUNTER
Spoke to patient's wife. Name noted on permission to release information. Identifiers x 2. Informed of echo results. Confirmed follow up. Verbalized understanding. Requested that upcoming appt be mailed to home address.      Future Appointments   Date Time Provider Hernandez Dena   6/27/2023  9:20 AM MD VIRAJ Lauren AMB

## 2023-02-01 NOTE — TELEPHONE ENCOUNTER
----- Message from Mo Huerta MD sent at 2/1/2023 10:12 AM EST -----  Please let pt know echo was overall normal. thx

## 2023-03-01 RX ORDER — SPIRONOLACTONE 25 MG/1
25 TABLET ORAL DAILY
Qty: 90 TABLET | Refills: 1 | Status: SHIPPED | OUTPATIENT
Start: 2023-03-01

## 2023-03-01 NOTE — TELEPHONE ENCOUNTER
Requested Prescriptions     Signed Prescriptions Disp Refills    spironolactone (ALDACTONE) 25 mg tablet 90 Tablet 1     Sig: TAKE 1 TABLET BY MOUTH DAILY     Authorizing Provider: Paula Strong     Ordering User: Sb Jason     Verbal order per Dr. Edwige Vines.    Future Appointments   Date Time Provider eHrnandez Fernandes   6/27/2023  9:20 AM MD VIRAJ Krause AMB

## 2023-06-27 ENCOUNTER — OFFICE VISIT (OUTPATIENT)
Age: 83
End: 2023-06-27
Payer: COMMERCIAL

## 2023-06-27 VITALS
DIASTOLIC BLOOD PRESSURE: 58 MMHG | HEIGHT: 68 IN | OXYGEN SATURATION: 100 % | SYSTOLIC BLOOD PRESSURE: 128 MMHG | WEIGHT: 167.4 LBS | BODY MASS INDEX: 25.37 KG/M2 | HEART RATE: 59 BPM

## 2023-06-27 DIAGNOSIS — I73.9 PERIPHERAL VASCULAR DISEASE, UNSPECIFIED (HCC): ICD-10-CM

## 2023-06-27 DIAGNOSIS — Z87.891 HISTORY OF TOBACCO USE: ICD-10-CM

## 2023-06-27 DIAGNOSIS — I10 ESSENTIAL (PRIMARY) HYPERTENSION: Primary | ICD-10-CM

## 2023-06-27 PROCEDURE — 3078F DIAST BP <80 MM HG: CPT | Performed by: INTERNAL MEDICINE

## 2023-06-27 PROCEDURE — 99214 OFFICE O/P EST MOD 30 MIN: CPT | Performed by: INTERNAL MEDICINE

## 2023-06-27 PROCEDURE — 1123F ACP DISCUSS/DSCN MKR DOCD: CPT | Performed by: INTERNAL MEDICINE

## 2023-06-27 PROCEDURE — 3074F SYST BP LT 130 MM HG: CPT | Performed by: INTERNAL MEDICINE

## 2023-06-27 RX ORDER — LOSARTAN POTASSIUM 100 MG/1
TABLET ORAL
COMMUNITY

## 2023-06-27 ASSESSMENT — PATIENT HEALTH QUESTIONNAIRE - PHQ9
2. FEELING DOWN, DEPRESSED OR HOPELESS: 0
SUM OF ALL RESPONSES TO PHQ QUESTIONS 1-9: 0
SUM OF ALL RESPONSES TO PHQ QUESTIONS 1-9: 0
1. LITTLE INTEREST OR PLEASURE IN DOING THINGS: 0
SUM OF ALL RESPONSES TO PHQ QUESTIONS 1-9: 0
SUM OF ALL RESPONSES TO PHQ9 QUESTIONS 1 & 2: 0
SUM OF ALL RESPONSES TO PHQ QUESTIONS 1-9: 0

## 2023-08-16 NOTE — DISCHARGE INSTRUCTIONS
Pediatric Well Adolescent Exam: 15-21 Years of age    Chief Complaint   Patient presents with   • Well Child       SUBJECTIVE:  Lazaro Laura is a 20 year old male who presents for a  well child exam.    Patient presents  with Mother who stayed for the entire visit  History provided by the mother     Preferred method of results communication:     Cell Phone:   Telephone Information:   Mobile [phone]     Okay to leave a message containing results? Yes    CONCERNS RAISED TODAY: Was noted to have 3 tick bites while on vacation in [place] shortly thereafter did have a febrile episode with fever which resolved spontaneously.  No history of any rashes  Father had similar symptoms.  Continues to have noisy breathing mouth breathing family did not obtain soft tissue films after the last visit and discussion.  He had an injury to his right shoulder capsule followed by [facility] orthopedics and was just recently given released to participate in hockey once again    RISK ASSESSMENT (HEADSSS):   Home  Lives with: mother and father  [x]  YES    []  NO    []  Unknown    Changes in home/work environment?  [x]  YES    []  NO    []  Unknown    Eats meals with family?  [x]  YES    []  NO    []  Unknown    Has family member/adult to turn to for              help?  [x]  YES    []  NO    []  Unknown    Is permitted and is able to make              independent decisions?  Education  Grade in school:  12th   [x]  Normal    []  Delayed            Academic performance?  [x]  Normal    []  Delayed            Behavior/attention?  [x]  Normal    []  Delayed            Homework?  Eating  Calcium Source: Dairy  [x]  YES    []  NO    []  Unknown    Eats regular meals including adequate              fruits and vegetables?  [x]  YES    []  NO    []  Unknown    Drinks non-sweetened liquids?  []  YES    [x]  NO    []  Unknown    Has concerns about body/appearance?  Activities  [x]  YES    []  NO    []  Unknown    Has friends?  [x]  YES     Elissa Guy  [id number]  1940    COLON DISCHARGE INSTRUCTIONS  Discomfort:  Redness at IV site- apply warm compress to area; if redness or soreness persist- contact your physician  There may be a slight amount of blood passed from the rectum  Gaseous discomfort- walking, belching will help relieve any discomfort  You may not operate a vehicle for 12 hours  You may not engage in an occupation involving machinery or appliances for rest of today  You may not drink alcoholic beverages for at least 12 hours  Avoid making any critical decisions for at least 24 hour  DIET:   Regular diet. - however -  remember your colon is empty and a heavy meal will produce gas. Avoid these foods:  vegetables, fried / greasy foods, carbonated drinks for today. MEDICATIONS:        Regarding Aspirin or Nonsteroidal medications, please see below. ACTIVITY:  You may resume your normal daily activities it is recommended that you spend the remainder of the day resting -  avoid any strenuous activity. CALL M.D. ANY SIGN OF:  Increasing pain, nausea, vomiting  Abdominal distension (swelling)  New increased bleeding (oral or rectal)  Fever (chills)  Pain in chest area  Bloody discharge from nose or mouth  Shortness of breath     Tylenol as needed for pain. Follow-up Instructions:   Call Dr. Margret Buerger for results of procedure / biopsy in 4-5 days at telephone #  [phone]              Repeat Colonoscopy in 5 years    Patient Education   Patient Education        Learning About Diverticulosis and Diverticulitis  What are diverticulosis and diverticulitis? In diverticulosis and diverticulitis, pouches called diverticula form in the wall of the large intestine, or colon. · In diverticulosis, the pouches do not cause any pain or other symptoms. · In diverticulitis, the pouches get inflamed or infected and cause symptoms. Doctors aren't sure what causes these pouches in the colon.  But they think that a low-fiber diet []  NO    []  Unknown    Has at least 1 hour of physical activity per             day?  [x]  YES    []  NO    []  Unknown    Electronic screen time less than 2              hours/day except for               homework?  [x]  YES    []  NO    []  Unknown   Volunteers and participates in              community activities?  [x]  YES    []  NO    []  Unknown    Involved in other activities (music,             drama,etc)?  PROVIDER ONLY QUESTIONS  Drugs  []  YES    []  NO    []  Confidential    Uses tobacco, alcohol or drugs?  Safety  [x]  YES    []  NO    []  Unknown    Home is free of violence?  [x]  YES    []  NO    []  Unknown    Uses safety belts/safety equipment?  []  YES    []  NO    []  Unknown    Impaired/distracted driving?  [x]  YES    []  NO    []  Unknown    Has peer relationships free of violence?  Sex  []  YES    []  NO    []  Confidential   Has had sex?  Suicide/Mental Health  [x]  YES    []  NO    []  Unknown   Has ways to cope with stress?  [x]  YES    []  NO    []  Unknown   Displays self confidence?  []  YES    [x]  NO    []  Unknown   Has problems with sleep?  []  YES    [x]  NO    []  Unknown   Gets depressed, anxious or irritable /             has mood swings?  []  YES    [x]  NO    []  Unknown    Has thoughts about hurting self or             considering suicide?    VISION SCREENING:   No results found.    OBJECTIVE:  PAST HISTORIES:  Allergies, Medications, Medical History, Surgical History, Family History reviewed and updated.  Toxic Exposure:   Tobacco Use: Never           IMMUNIZATION STATUS: Up to date per review of WIR data base.    IMMUNIZATION REACTIONS: None   VARICELLA STATUS: confirmed by vaccine administration    RECENT HEALTH EVENTS:  Illnesses: []  YES    [x]  NO    []  N/A  Hospitalizations: []  YES    [x]  NO    []  N/A  Injuries or Accidents: []  YES    []  NO    []  N/A    REVIEW OF SYSTEMS:    All systems reviewed and negative except as documented in \"Concerns  may play a role. Without fiber to add bulk to the stool, the colon has to work harder than normal to push the stool forward. The pressure from this may cause pouches to form in weak spots along the colon. Some people with diverticulosis get diverticulitis. But experts don't know why this happens. What are the symptoms? · In diverticulosis, most people don't have symptoms. But pouches sometimes bleed. · In diverticulitis, symptoms may last from a few hours to a week or more. They include:  ? Belly pain. This is usually in the lower left side. It is sometimes worse when you move. This is the most common symptom. ? Fever and chills. ? Bloating and gas. ? Diarrhea or constipation. ? Nausea and sometimes vomiting.  ? Not feeling like eating. How can you prevent diverticulitis? You may be able to lower your chance of getting diverticulitis. You can do this by taking steps to prevent constipation. · Eat fruits, vegetables, beans, and whole grains every day. These foods are high in fiber. · Drink plenty of fluids. If you have kidney, heart, or liver disease and have to limit fluids, talk with your doctor before you increase the amount of fluids you drink. · Get at least 30 minutes of exercise on most days of the week. Walking is a good choice. You also may want to do other activities, such as running, swimming, cycling, or playing tennis or team sports. · Take a fiber supplement, such as Citrucel or Metamucil, every day if needed. Read and follow all instructions on the label. · Schedule time each day for a bowel movement. Having a daily routine may help. Take your time and do not strain when having a bowel movement. Some people avoid nuts, seeds, berries, and popcorn. They believe that these foods might get trapped in the diverticula and cause pain. But there is no proof that these foods cause diverticulitis or make it worse. How are these problems treated?   · The best way to treat diverticulosis is to avoid constipation. · Treatment for diverticulitis includes antibiotics. It often includes a change in your diet. You may need only liquids at first. Your doctor may suggest pain medicines for pain or belly cramps. In some cases, surgery may be needed. Follow-up care is a key part of your treatment and safety. Be sure to make and go to all appointments, and call your doctor if you are having problems. It's also a good idea to know your test results and keep a list of the medicines you take. Where can you learn more? Go to http://www.gray.com/  Enter D733 in the search box to learn more about \"Learning About Diverticulosis and Diverticulitis. \"  Current as of: September 8, 2021               Content Version: 13.2  © 2006-2022 [employer]. Care instructions adapted under license by [employer] (which disclaims liability or warranty for this information). If you have questions about a medical condition or this instruction, always ask your healthcare professional. [place] [address] any warranty or liability for your use of this information. Colon Polyps: Care Instructions  Your Care Instructions     Colon polyps are growths in the colon or the rectum. The cause of most colon polyps is not known, and most people who get them do not have any problems. But a certain kind can turn into cancer. For this reason, regular testing for colon polyps is important for people as they get older. It is also important for anyone who has an increased risk for colon cancer. Polyps are usually found through routine colon cancer screening tests. Although most colon polyps are not cancerous, they are usually removed and then tested for cancer. Screening for colon cancer saves lives because the cancer can usually be cured if it is caught early. If you have a polyp that is the type that can turn into cancer, you may need more tests to examine your entire colon. raised\".    PHYSICAL EXAM:   VITAL SIGNS:   Visit Vitals  /70   Pulse 75   Temp 97.4 °F (36.3 °C)   Ht 5' 9\" (1.753 m)   Wt 62.7 kg (138 lb 2 oz)   SpO2 100%   BMI 20.40 kg/m²    31 %ile (Z= -0.51) based on [facility] (Boys, 2-20 Years) weight-for-age data using vitals from 8/16/2023.  GENERAL:  Well appearing male child.  Alert, active and consolable.  SKIN: Warm, normal turgor.  No cyanosis.  No rash.  HEAD:  Normocephalic, atraumatic.    EYES:  Conjunctivae appear normal, non-injected, non-icteric, positive red reflex.  NOSE:  Appears normal without drainage.  EARS:  Normal external auditory canals. TMs are transparent with normal landmarks.  THROAT:  Oropharynx with moist mucus membranes without lesions.  NECK:  Supple, no lymphadenopathy or masses.  HEART:  Regular rate and rhythm.  Normal S1, S2.  No murmurs, rubs, gallops.   LUNGS:  Clear to auscultation.  No wheezes, rales, rhonchi.  Normal work effort with breathing.  ABDOMEN:  Bowel sounds present. Soft, nontender. No hepatomegaly, splenomegaly or masses.  GENITOURINARY: Bang stage IV  EXTREMITIES: Symmetrical muscle mass, strength all extremities.  No effusions.   BACK: Spine straight.  No CVA tenderness.      NEUROLOGIC:  Oriented x 4. No gross lateralizing signs or focal deficits.  Normal gait.      Assessment:   Well 20 year old male adolescent.  We will be a senior in high school- fall 2023  Plays hockey for [employer]  Had a right shoulder capsule injury no dislocation no labrum injury is completed rehab and was just given released to participate in sports fully.  Febrile episode in July after 3 tick bites while in [place]- Lyme titers were sent  Family will complete the neck imaging previously ordered- there is no evidence of tonsillar or adenoidal hypertrophy or other physical obstructive impediments then consider allergy treatment with Flonase saline drops and consistent use of nonsedating antihistamines progress report in 2 to 3  The doctor will remove any other polyps that he or she finds, and you will be tested more often. Follow-up care is a key part of your treatment and safety. Be sure to make and go to all appointments, and call your doctor if you are having problems. It's also a good idea to know your test results and keep a list of the medicines you take. How can you care for yourself at home? Regular exams to look for colon polyps are the best way to prevent polyps from turning into colon cancer. These can include stool tests, sigmoidoscopy, colonoscopy, and CT colonography. Talk with your doctor about a testing schedule that is right for you. To prevent polyps  There is no home treatment that can prevent colon polyps. But these steps may help lower your risk for cancer. · Stay active. Being active can help you get to and stay at a healthy weight. Try to exercise on most days of the week. Walking is a good choice. · Eat well. Choose a variety of vegetables, fruits, legumes (such as peas and beans), fish, poultry, and whole grains. · Do not smoke. If you need help quitting, talk to your doctor about stop-smoking programs and medicines. These can increase your chances of quitting for good. · If you drink alcohol, limit how much you drink. Limit alcohol to 2 drinks a day for men and 1 drink a day for women. When should you call for help? Call your doctor now or seek immediate medical care if:    · You have severe belly pain.     · Your stools are maroon or very bloody. Watch closely for changes in your health, and be sure to contact your doctor if:    · You have a fever.     · You have nausea or vomiting.     · You have a change in bowel habits (new constipation or diarrhea).     · Your symptoms get worse or are not improving as expected. Where can you learn more? Go to http://www.CloudOpt.com/  Enter C571 in the search box to learn more about \"Colon Polyps: Care Instructions. \"  Current as of: weeks.  Received Menveo 2/2 today  Received Gardasil 1/3 today  Plan:  All parental concerns and questions discussed.  Anticipatory guidance provided, handout/s given Tobacco, ETOH, illicit drug avoidance  School achievement  Family/Peer relationships  Regular physical activity  Health food choices  Immunizations per orders.  Risks, benefits, and side effects discussed.     Orders for immunizations given today per the recommended schedule.  VIS(Vaccine Information Statement) for Immunizations given.    Follow up: Yearly      Total additional time of 12 minutes was spent discussing the following more complex problems/issues during a routine scheduled well visit:  Tick bite and subsequent febrile episode  Ongoing issues with snoring  Shoulder follow-up  Further documentation, counseling, ordering of labs, imaging or scheduling follow-up with consultants, outside medical history was required beyond the scope of a typical well visit exam.    Charles Neumann MD       September 8, 2021               Content Version: 13.2  © 6616-3661 [employer], [employer]. Care instructions adapted under license by [employer] (which disclaims liability or warranty for this information). If you have questions about a medical condition or this instruction, always ask your healthcare professional. [address] any warranty or liability for your use of this information.

## 2023-09-11 RX ORDER — SPIRONOLACTONE 25 MG/1
25 TABLET ORAL DAILY
Qty: 90 TABLET | Refills: 3 | Status: SHIPPED | OUTPATIENT
Start: 2023-09-11

## 2023-09-11 NOTE — TELEPHONE ENCOUNTER
Requested Prescriptions     Signed Prescriptions Disp Refills    spironolactone (ALDACTONE) 25 MG tablet 90 tablet 3     Sig: TAKE 1 TABLET BY MOUTH DAILY     Authorizing Provider: Sue Orr     Ordering User:  Kartik Leon per Dr. Alejandra Freedman.     Future Appointments   Date Time Provider 4600  46[facility]   12/28/2023  9:20 MD TORREY Kramer [facility]B

## 2023-12-28 ENCOUNTER — OFFICE VISIT (OUTPATIENT)
Age: 83
End: 2023-12-28
Payer: COMMERCIAL

## 2023-12-28 VITALS
HEART RATE: 56 BPM | HEIGHT: 68 IN | BODY MASS INDEX: 25.43 KG/M2 | DIASTOLIC BLOOD PRESSURE: 80 MMHG | SYSTOLIC BLOOD PRESSURE: 184 MMHG | OXYGEN SATURATION: 98 % | WEIGHT: 167.8 LBS

## 2023-12-28 DIAGNOSIS — I44.7 LEFT BUNDLE-BRANCH BLOCK, UNSPECIFIED: ICD-10-CM

## 2023-12-28 DIAGNOSIS — I73.9 PERIPHERAL VASCULAR DISEASE, UNSPECIFIED (HCC): ICD-10-CM

## 2023-12-28 DIAGNOSIS — Z72.0 TOBACCO USE: ICD-10-CM

## 2023-12-28 DIAGNOSIS — I1A.0 RESISTANT HYPERTENSION: Primary | ICD-10-CM

## 2023-12-28 PROCEDURE — 1123F ACP DISCUSS/DSCN MKR DOCD: CPT | Performed by: INTERNAL MEDICINE

## 2023-12-28 PROCEDURE — 99214 OFFICE O/P EST MOD 30 MIN: CPT | Performed by: INTERNAL MEDICINE

## 2023-12-28 PROCEDURE — 93000 ELECTROCARDIOGRAM COMPLETE: CPT | Performed by: INTERNAL MEDICINE

## 2023-12-28 PROCEDURE — 3079F DIAST BP 80-89 MM HG: CPT | Performed by: INTERNAL MEDICINE

## 2023-12-28 PROCEDURE — 3077F SYST BP >= 140 MM HG: CPT | Performed by: INTERNAL MEDICINE

## 2023-12-28 NOTE — PROGRESS NOTES
rhonchi  Heart - normal rate, regular rhythm, normal S1, S2, no murmurs, rubs, clicks or gallops  Abdomen - soft, nontender, nondistended, no masses or organomegaly  Back exam - full range of motion, no tenderness  Neurological - no focal deficits  Extremities - peripheral pulses normal, no pedal edema      Recent Labs:  No results found for: \"CHOL\", \"CHOLX\", \"CHLST\", \"CHOLV\", \"103229\", \"HDL\", \"HDLC\", \"LDL\", \"LDLC\", \"TGLX\", \"TRIGL\"  No results found for: \"JASMIN\", \"CREAPOC\", \"CREA\"  No results found for: \"BUN\", \"BUNPOC\", \"IBUN\"  No results found for: \"K\", \"PLK\", \"WBK\"  No results found for: \"HBA1C\"  No results found for: \"HGBPOC\", \"HGB\", \"HGBP\"  No results found for: \"PLT\"    Reviewed:  Past Medical History:   Diagnosis Date    Carotid arterial disease (HCC)     Hypertension      Social History     Tobacco Use   Smoking Status Every Day    Current packs/day: 1.00    Types: Cigarettes   Smokeless Tobacco Never     Social History     Substance and Sexual Activity   Alcohol Use Not Currently     No Known Allergies    Current Outpatient Medications   Medication Sig    losartan (COZAAR) 100 MG tablet 1 tablet By Mouth Once a day for 90 days    amLODIPine (NORVASC) 10 MG tablet Take 1 tablet by mouth nightly    aspirin 81 MG EC tablet Take 1 tablet by mouth 2 times daily    vitamin D 25 MCG (1000 UT) CAPS Take by mouth daily    cilostazol (PLETAL) 100 MG tablet Take by mouth 2 times daily (before meals)    cloNIDine (CATAPRES) 0.1 MG/24HR PTWK Place 1 patch onto the skin every 7 days    hydroCHLOROthiazide (HYDRODIURIL) 25 MG tablet Take 1 tablet by mouth daily    losartan-hydroCHLOROthiazide (HYZAAR) 100-25 MG per tablet Take 1 tablet by mouth daily    spironolactone (ALDACTONE) 25 MG tablet TAKE 1 TABLET BY MOUTH DAILY (Patient not taking: Reported on 12/28/2023)     No current facility-administered medications for this visit.        Delores Cabral MD  [facility] [place]  [address] [place] [address]

## (undated) DEVICE — TOWEL 4 PLY TISS 19X30 SUE WHT

## (undated) DEVICE — 1200 GUARD II KIT W/5MM TUBE W/O VAC TUBE: Brand: GUARDIAN

## (undated) DEVICE — SOLIDIFIER FLD 2OZ 1500CC N DISINF IN BTL DISP SAFESORB

## (undated) DEVICE — SUTURE VCRL SZ 3-0 L27IN ABSRB UD L26MM SH 1/2 CIR J416H

## (undated) DEVICE — SUT ETHLN 3-0 18IN PS2 BLK --

## (undated) DEVICE — NEONATAL-ADULT SPO2 SENSOR: Brand: NELLCOR

## (undated) DEVICE — SOLUTION IV 500ML 0.9% SOD CHL FLX CONT

## (undated) DEVICE — SNARE ENDOSCP M L240CM W27MM SHTH DIA2.4MM CHN 2.8MM OVL

## (undated) DEVICE — AGENT HEMSTAT W4XL4IN OXIDIZED REGENERATED CELOS ABSRB SFT

## (undated) DEVICE — DEVON™ KNEE AND BODY STRAP 60" X 3" (1.5 M X 7.6 CM): Brand: DEVON

## (undated) DEVICE — STRAINER URIN CALC RNL MSH -- CONVERT TO ITEM 357634

## (undated) DEVICE — Device

## (undated) DEVICE — CAROTID ARTERY SHUNT KIT,RADIOPAQUE LINE, STRAIGHT: Brand: ARGYLE

## (undated) DEVICE — X-RAY SPONGES,16 PLY: Brand: DERMACEA

## (undated) DEVICE — STRIP,CLOSURE,WOUND,MEDI-STRIP,1/2X4: Brand: MEDLINE

## (undated) DEVICE — SUT PROL 7-0 24IN BV1 DA BLU --

## (undated) DEVICE — SET ADMIN 16ML TBNG L100IN 2 Y INJ SITE IV PIGGY BK DISP

## (undated) DEVICE — SOLUTION IV 1000ML 0.9% SOD CHL

## (undated) DEVICE — REM POLYHESIVE ADULT PATIENT RETURN ELECTRODE: Brand: VALLEYLAB

## (undated) DEVICE — STERILE POLYISOPRENE POWDER-FREE SURGICAL GLOVES WITH EMOLLIENT COATING: Brand: PROTEXIS

## (undated) DEVICE — KENDALL SCD EXPRESS SLEEVES, KNEE LENGTH, MEDIUM: Brand: KENDALL SCD

## (undated) DEVICE — ELECTRODE,RADIOTRANSLUCENT,FOAM,5PK: Brand: MEDLINE

## (undated) DEVICE — Z DISCONTINUED PER MEDLINE LINE GAS SAMPLING O2/CO2 LNG AD 13 FT NSL W/ TBNG FILTERLINE

## (undated) DEVICE — TRAP,MUCUS SPECIMEN, 80CC: Brand: MEDLINE

## (undated) DEVICE — INFECTION CONTROL KIT SYS

## (undated) DEVICE — CONTAINER SPEC 20 ML LID NEUT BUFF FORMALIN 10 % POLYPR STS

## (undated) DEVICE — HYPODERMIC SAFETY NEEDLE: Brand: MAGELLAN

## (undated) DEVICE — DRAIN SURG W7MMXL20CM SIL FULL PERF HUBLESS FLAT RADPQ STRP

## (undated) DEVICE — NON-REM POLYHESIVE PATIENT RETURN ELECTRODE: Brand: VALLEYLAB

## (undated) DEVICE — MAGNETIC DRAPE: Brand: DEVON

## (undated) DEVICE — HANDLE LT SNAP ON ULT DURABLE LENS FOR TRUMPF ALC DISPOSABLE

## (undated) DEVICE — WIPE 400300 MEROCEL 20PK INSTRUMENT: Brand: MEROCEL®

## (undated) DEVICE — SYR 10ML LUER LOK 1/5ML GRAD --

## (undated) DEVICE — BASIN EMSIS 16OZ GRAPHITE PLAS KID SHP MOLD GRAD FOR ORAL

## (undated) DEVICE — SUTURE PROL 5-0 L18IN NONABSORBABLE BLU RB-2 L13MM 1/2 CIR 8713H

## (undated) DEVICE — MASTISOL ADHESIVE LIQ 2/3ML

## (undated) DEVICE — SYR 3ML LL TIP 1/10ML GRAD --

## (undated) DEVICE — SOLUTION IRRIG 1000ML H2O STRL BLT

## (undated) DEVICE — VASCULAR-RICHMOND-LF: Brand: MEDLINE INDUSTRIES, INC.

## (undated) DEVICE — CATH IV AUTOGRD BC PNK 20GA 25 -- INSYTE

## (undated) DEVICE — SUTURE MCRYL SZ 4-0 L27IN ABSRB UD L19MM PS-2 1/2 CIR PRIM Y426H

## (undated) DEVICE — TRAY PREP DRY W/ PREM GLV 2 APPL 6 SPNG 2 UNDPD 1 OVERWRAP